# Patient Record
Sex: FEMALE | Race: WHITE | NOT HISPANIC OR LATINO
[De-identification: names, ages, dates, MRNs, and addresses within clinical notes are randomized per-mention and may not be internally consistent; named-entity substitution may affect disease eponyms.]

---

## 2017-04-03 ENCOUNTER — APPOINTMENT (OUTPATIENT)
Dept: OPHTHALMOLOGY | Facility: CLINIC | Age: 62
End: 2017-04-03

## 2017-05-10 ENCOUNTER — APPOINTMENT (OUTPATIENT)
Dept: OPHTHALMOLOGY | Facility: CLINIC | Age: 62
End: 2017-05-10

## 2017-05-10 DIAGNOSIS — H00.16 CHALAZION LEFT EYE, UNSPECIFIED EYELID: ICD-10-CM

## 2017-05-10 DIAGNOSIS — H10.13 ACUTE ATOPIC CONJUNCTIVITIS, BILATERAL: ICD-10-CM

## 2017-06-15 ENCOUNTER — RESULT REVIEW (OUTPATIENT)
Age: 62
End: 2017-06-15

## 2017-08-09 ENCOUNTER — APPOINTMENT (OUTPATIENT)
Dept: OPHTHALMOLOGY | Facility: CLINIC | Age: 62
End: 2017-08-09
Payer: COMMERCIAL

## 2017-08-09 PROCEDURE — 92014 COMPRE OPH EXAM EST PT 1/>: CPT

## 2017-08-16 ENCOUNTER — APPOINTMENT (OUTPATIENT)
Dept: OPHTHALMOLOGY | Facility: CLINIC | Age: 62
End: 2017-08-16
Payer: COMMERCIAL

## 2017-08-16 DIAGNOSIS — B02.30 ZOSTER OCULAR DISEASE, UNSPECIFIED: ICD-10-CM

## 2017-08-16 PROCEDURE — 92012 INTRM OPH EXAM EST PATIENT: CPT

## 2017-09-28 ENCOUNTER — APPOINTMENT (OUTPATIENT)
Dept: PULMONOLOGY | Facility: CLINIC | Age: 62
End: 2017-09-28
Payer: COMMERCIAL

## 2017-09-28 VITALS
DIASTOLIC BLOOD PRESSURE: 80 MMHG | OXYGEN SATURATION: 96 % | HEIGHT: 65 IN | HEART RATE: 82 BPM | WEIGHT: 128 LBS | BODY MASS INDEX: 21.33 KG/M2 | SYSTOLIC BLOOD PRESSURE: 126 MMHG

## 2017-09-28 DIAGNOSIS — Z86.19 PERSONAL HISTORY OF OTHER INFECTIOUS AND PARASITIC DISEASES: ICD-10-CM

## 2017-09-28 DIAGNOSIS — K22.4 DYSKINESIA OF ESOPHAGUS: ICD-10-CM

## 2017-09-28 PROCEDURE — 94010 BREATHING CAPACITY TEST: CPT

## 2017-09-28 PROCEDURE — 94729 DIFFUSING CAPACITY: CPT

## 2017-09-28 PROCEDURE — 99204 OFFICE O/P NEW MOD 45 MIN: CPT | Mod: 25

## 2017-09-28 PROCEDURE — 71020: CPT

## 2017-09-28 PROCEDURE — 94727 GAS DIL/WSHOT DETER LNG VOL: CPT

## 2017-09-28 RX ORDER — MOXIFLOXACIN HYDROCHLORIDE 5 MG/ML
0.5 SOLUTION/ DROPS OPHTHALMIC 4 TIMES DAILY
Qty: 1 | Refills: 2 | Status: DISCONTINUED | COMMUNITY
Start: 2017-08-09 | End: 2017-09-28

## 2017-12-26 ENCOUNTER — APPOINTMENT (OUTPATIENT)
Dept: PULMONOLOGY | Facility: CLINIC | Age: 62
End: 2017-12-26
Payer: COMMERCIAL

## 2017-12-26 VITALS
DIASTOLIC BLOOD PRESSURE: 70 MMHG | HEIGHT: 65 IN | WEIGHT: 128 LBS | SYSTOLIC BLOOD PRESSURE: 110 MMHG | HEART RATE: 93 BPM | BODY MASS INDEX: 21.33 KG/M2 | RESPIRATION RATE: 17 BRPM | OXYGEN SATURATION: 98 %

## 2017-12-26 DIAGNOSIS — J30.9 ALLERGIC RHINITIS, UNSPECIFIED: ICD-10-CM

## 2017-12-26 PROCEDURE — 71020: CPT

## 2017-12-26 PROCEDURE — 99214 OFFICE O/P EST MOD 30 MIN: CPT | Mod: 25

## 2018-01-06 ENCOUNTER — EMERGENCY (EMERGENCY)
Facility: HOSPITAL | Age: 63
LOS: 1 days | Discharge: HOME | End: 2018-01-06
Attending: EMERGENCY MEDICINE
Payer: COMMERCIAL

## 2018-01-06 VITALS
RESPIRATION RATE: 18 BRPM | DIASTOLIC BLOOD PRESSURE: 83 MMHG | OXYGEN SATURATION: 100 % | SYSTOLIC BLOOD PRESSURE: 150 MMHG | TEMPERATURE: 98 F | HEART RATE: 75 BPM

## 2018-01-06 LAB
ALBUMIN SERPL ELPH-MCNC: 4.8 G/DL — SIGNIFICANT CHANGE UP (ref 3.3–5)
ALP SERPL-CCNC: 114 U/L — SIGNIFICANT CHANGE UP (ref 40–120)
ALT FLD-CCNC: 20 U/L RC — SIGNIFICANT CHANGE UP (ref 10–45)
ANION GAP SERPL CALC-SCNC: 14 MMOL/L — SIGNIFICANT CHANGE UP (ref 5–17)
APTT BLD: 27.8 SEC — SIGNIFICANT CHANGE UP (ref 27.5–37.4)
AST SERPL-CCNC: 17 U/L — SIGNIFICANT CHANGE UP (ref 10–40)
BASE EXCESS BLDV CALC-SCNC: 4.5 MMOL/L — HIGH (ref -2–2)
BASOPHILS # BLD AUTO: 0.1 K/UL — SIGNIFICANT CHANGE UP (ref 0–0.2)
BASOPHILS NFR BLD AUTO: 0.7 % — SIGNIFICANT CHANGE UP (ref 0–2)
BILIRUB SERPL-MCNC: 0.3 MG/DL — SIGNIFICANT CHANGE UP (ref 0.2–1.2)
BUN SERPL-MCNC: 23 MG/DL — SIGNIFICANT CHANGE UP (ref 7–23)
CA-I SERPL-SCNC: 1.21 MMOL/L — SIGNIFICANT CHANGE UP (ref 1.12–1.3)
CALCIUM SERPL-MCNC: 9.9 MG/DL — SIGNIFICANT CHANGE UP (ref 8.4–10.5)
CHLORIDE BLDV-SCNC: 104 MMOL/L — SIGNIFICANT CHANGE UP (ref 96–108)
CHLORIDE SERPL-SCNC: 98 MMOL/L — SIGNIFICANT CHANGE UP (ref 96–108)
CK MB CFR SERPL CALC: 1 NG/ML — SIGNIFICANT CHANGE UP (ref 0–3.8)
CK SERPL-CCNC: 26 U/L — SIGNIFICANT CHANGE UP (ref 25–170)
CO2 BLDV-SCNC: 34 MMOL/L — HIGH (ref 22–30)
CO2 SERPL-SCNC: 29 MMOL/L — SIGNIFICANT CHANGE UP (ref 22–31)
CREAT SERPL-MCNC: 0.85 MG/DL — SIGNIFICANT CHANGE UP (ref 0.5–1.3)
EOSINOPHIL # BLD AUTO: 0.1 K/UL — SIGNIFICANT CHANGE UP (ref 0–0.5)
EOSINOPHIL NFR BLD AUTO: 0.4 % — SIGNIFICANT CHANGE UP (ref 0–6)
GAS PNL BLDV: 139 MMOL/L — SIGNIFICANT CHANGE UP (ref 136–145)
GAS PNL BLDV: SIGNIFICANT CHANGE UP
GLUCOSE BLDV-MCNC: 80 MG/DL — SIGNIFICANT CHANGE UP (ref 70–99)
GLUCOSE SERPL-MCNC: 81 MG/DL — SIGNIFICANT CHANGE UP (ref 70–99)
HCO3 BLDV-SCNC: 32 MMOL/L — HIGH (ref 21–29)
HCT VFR BLD CALC: 44.9 % — SIGNIFICANT CHANGE UP (ref 34.5–45)
HCT VFR BLDA CALC: 49 % — SIGNIFICANT CHANGE UP (ref 39–50)
HGB BLD CALC-MCNC: 16.1 G/DL — HIGH (ref 11.5–15.5)
HGB BLD-MCNC: 15.5 G/DL — SIGNIFICANT CHANGE UP (ref 11.5–15.5)
HOROWITZ INDEX BLDV+IHG-RTO: SIGNIFICANT CHANGE UP
INR BLD: 0.96 RATIO — SIGNIFICANT CHANGE UP (ref 0.88–1.16)
LACTATE BLDV-MCNC: 2.2 MMOL/L — HIGH (ref 0.7–2)
LIDOCAIN IGE QN: 22 U/L — SIGNIFICANT CHANGE UP (ref 7–60)
LYMPHOCYTES # BLD AUTO: 32.6 % — SIGNIFICANT CHANGE UP (ref 13–44)
LYMPHOCYTES # BLD AUTO: 5.3 K/UL — HIGH (ref 1–3.3)
MCHC RBC-ENTMCNC: 32 PG — SIGNIFICANT CHANGE UP (ref 27–34)
MCHC RBC-ENTMCNC: 34.5 GM/DL — SIGNIFICANT CHANGE UP (ref 32–36)
MCV RBC AUTO: 92.6 FL — SIGNIFICANT CHANGE UP (ref 80–100)
MONOCYTES # BLD AUTO: 1.3 K/UL — HIGH (ref 0–0.9)
MONOCYTES NFR BLD AUTO: 8.1 % — SIGNIFICANT CHANGE UP (ref 2–14)
NEUTROPHILS # BLD AUTO: 9.5 K/UL — HIGH (ref 1.8–7.4)
NEUTROPHILS NFR BLD AUTO: 58.2 % — SIGNIFICANT CHANGE UP (ref 43–77)
NEUTS HYPERSEG # BLD: PRESENT — SIGNIFICANT CHANGE UP
NT-PROBNP SERPL-SCNC: 167 PG/ML — SIGNIFICANT CHANGE UP (ref 0–300)
PCO2 BLDV: 59 MMHG — HIGH (ref 35–50)
PH BLDV: 7.35 — SIGNIFICANT CHANGE UP (ref 7.35–7.45)
PLAT MORPH BLD: NORMAL — SIGNIFICANT CHANGE UP
PLATELET # BLD AUTO: 321 K/UL — SIGNIFICANT CHANGE UP (ref 150–400)
PO2 BLDV: 24 MMHG — LOW (ref 25–45)
POTASSIUM BLDV-SCNC: 3.6 MMOL/L — SIGNIFICANT CHANGE UP (ref 3.5–5)
POTASSIUM SERPL-MCNC: 3.5 MMOL/L — SIGNIFICANT CHANGE UP (ref 3.5–5.3)
POTASSIUM SERPL-SCNC: 3.5 MMOL/L — SIGNIFICANT CHANGE UP (ref 3.5–5.3)
PROT SERPL-MCNC: 8.2 G/DL — SIGNIFICANT CHANGE UP (ref 6–8.3)
PROTHROM AB SERPL-ACNC: 10.4 SEC — SIGNIFICANT CHANGE UP (ref 9.8–12.7)
RBC # BLD: 4.85 M/UL — SIGNIFICANT CHANGE UP (ref 3.8–5.2)
RBC # FLD: 11.8 % — SIGNIFICANT CHANGE UP (ref 10.3–14.5)
RBC BLD AUTO: NORMAL — SIGNIFICANT CHANGE UP
SAO2 % BLDV: 32 % — LOW (ref 67–88)
SODIUM SERPL-SCNC: 141 MMOL/L — SIGNIFICANT CHANGE UP (ref 135–145)
TROPONIN T SERPL-MCNC: <0.01 NG/ML — SIGNIFICANT CHANGE UP (ref 0–0.06)
TROPONIN T SERPL-MCNC: <0.01 NG/ML — SIGNIFICANT CHANGE UP (ref 0–0.06)
WBC # BLD: 16.3 K/UL — HIGH (ref 3.8–10.5)
WBC # FLD AUTO: 16.3 K/UL — HIGH (ref 3.8–10.5)

## 2018-01-06 PROCEDURE — 71046 X-RAY EXAM CHEST 2 VIEWS: CPT | Mod: 26

## 2018-01-06 PROCEDURE — 93010 ELECTROCARDIOGRAM REPORT: CPT | Mod: 59

## 2018-01-06 PROCEDURE — 99220: CPT | Mod: 25

## 2018-01-06 PROCEDURE — 71045 X-RAY EXAM CHEST 1 VIEW: CPT | Mod: 26

## 2018-01-06 RX ORDER — SODIUM CHLORIDE 9 MG/ML
3 INJECTION INTRAMUSCULAR; INTRAVENOUS; SUBCUTANEOUS ONCE
Qty: 0 | Refills: 0 | Status: COMPLETED | OUTPATIENT
Start: 2018-01-06 | End: 2018-01-06

## 2018-01-06 RX ORDER — SODIUM CHLORIDE 9 MG/ML
3 INJECTION INTRAMUSCULAR; INTRAVENOUS; SUBCUTANEOUS EVERY 8 HOURS
Qty: 0 | Refills: 0 | Status: DISCONTINUED | OUTPATIENT
Start: 2018-01-06 | End: 2018-01-10

## 2018-01-06 RX ORDER — SODIUM CHLORIDE 9 MG/ML
1000 INJECTION INTRAMUSCULAR; INTRAVENOUS; SUBCUTANEOUS ONCE
Qty: 0 | Refills: 0 | Status: COMPLETED | OUTPATIENT
Start: 2018-01-06 | End: 2018-01-06

## 2018-01-06 RX ORDER — PANTOPRAZOLE SODIUM 20 MG/1
40 TABLET, DELAYED RELEASE ORAL
Qty: 0 | Refills: 0 | Status: DISCONTINUED | OUTPATIENT
Start: 2018-01-06 | End: 2018-01-10

## 2018-01-06 RX ORDER — SIMVASTATIN 20 MG/1
10 TABLET, FILM COATED ORAL AT BEDTIME
Qty: 0 | Refills: 0 | Status: DISCONTINUED | OUTPATIENT
Start: 2018-01-06 | End: 2018-01-10

## 2018-01-06 RX ADMIN — SODIUM CHLORIDE 1000 MILLILITER(S): 9 INJECTION INTRAMUSCULAR; INTRAVENOUS; SUBCUTANEOUS at 16:24

## 2018-01-06 RX ADMIN — SODIUM CHLORIDE 3 MILLILITER(S): 9 INJECTION INTRAMUSCULAR; INTRAVENOUS; SUBCUTANEOUS at 15:35

## 2018-01-06 RX ADMIN — SODIUM CHLORIDE 1000 MILLILITER(S): 9 INJECTION INTRAMUSCULAR; INTRAVENOUS; SUBCUTANEOUS at 19:45

## 2018-01-06 RX ADMIN — SODIUM CHLORIDE 3 MILLILITER(S): 9 INJECTION INTRAMUSCULAR; INTRAVENOUS; SUBCUTANEOUS at 22:12

## 2018-01-06 RX ADMIN — Medication 30 MILLIGRAM(S): at 17:45

## 2018-01-06 RX ADMIN — SIMVASTATIN 10 MILLIGRAM(S): 20 TABLET, FILM COATED ORAL at 22:15

## 2018-01-06 NOTE — ED CDU PROVIDER DISPOSITION NOTE - CLINICAL COURSE
61yo female p/w chest pain. Pt. has had bronchitis since December 19th. was on zithromax for 7 days. Yesterday finished coarse of doxycycline. on prednisone 30mg and will taper this week. Yesterday, pt. had some abdominal discomfort, used 2 enemas had bowel movements, felt better. At 11pm, pt. had pain in chin and epigastric discomfort, took tums and it improved after passing flatus. Pt. seen at urgent care, had EKG done and reported that it was normal, referred to ED. Currently pt. reports that pain has resolved. Xray on 12/26 was clear per patient. PMD: Dr. Jasper Chapman. Denies fevers, vomiting, diarrhea, dysuria/hematuria, recent travel. Pt. reports cough, no sputum. +sick contacts with URI sx's. Pt. was scheduled for stress test at the beginning of the month. Pola negative x 2. Stress test __________. 61yo female p/w chest pain. Pt. has had bronchitis since December 19th. was on zithromax for 7 days. Yesterday finished coarse of doxycycline. on prednisone 30mg and will taper this week. Yesterday, pt. had some abdominal discomfort, used 2 enemas had bowel movements, felt better. At 11pm, pt. had pain in chin and epigastric discomfort, took tums and it improved after passing flatus. Pt. seen at urgent care, had EKG done and reported that it was normal, referred to ED. Currently pt. reports that pain has resolved. Xray on 12/26 was clear per patient. PMD: Dr. Jasper Chapman. Denies fevers, vomiting, diarrhea, dysuria/hematuria, recent travel. Pt. reports cough, no sputum. +sick contacts with URI sx's. Pt. was scheduled for stress test at the beginning of the month. Pola negative x 2. Pt resting comfortably. NAD. No complaints. VSS. Neg stress for acute pathology, results discussed with Dr. Chapman Pt to follow up with with him in office on Thursday. tolerating PO. stable for discharge; spoke to Dr. Chapa.

## 2018-01-06 NOTE — ED CDU PROVIDER INITIAL DAY NOTE - PROGRESS NOTE DETAILS
CDU NOTE DORA MAXWELL: Pt resting comfortably, feeling well without complaint. states she had some indigestion earlier. no CP or heartburn now. doesn't want any medication for indigestion. NAD, VSS. No events on telemetry. CV: S1S2 RRR, Lungs: CTA B/L.

## 2018-01-06 NOTE — ED PROVIDER NOTE - MEDICAL DECISION MAKING DETAILS
JPATEL: 63 y/o female with Crohn's disease, currently on steroids for asthma, finished two courses of antibiotics, had episode of jaw pain, with substernal gas pain, 2 episodes including one that woke her from her sleep, pt with no hx of CAD, last stress test years ago, cardiologist Jasper Chapman, nonsmoker, seen at urgent care and sent here for CHLOE, 1)tele 2)EKG 3)CBC, CMP, CE, CXR, 4)Cardiology 5)admit

## 2018-01-06 NOTE — ED ADULT NURSE NOTE - CHPI ED SYMPTOMS NEG
no cough/no dizziness/no fever/no chills/no diaphoresis/no back pain/no nausea/no syncope/no shortness of breath/no vomiting

## 2018-01-06 NOTE — ED CDU PROVIDER DISPOSITION NOTE - ATTENDING CONTRIBUTION TO CARE
I have personally performed a face to face diagnostic evaluation on this patient.  I have reviewed the ACP note and agree with the history, exam, and plan of care, except as noted.  History and Exam by me shows  See CDU initial day note,Stable for dc  Alex Chapa MD, Facep

## 2018-01-06 NOTE — ED CDU PROVIDER INITIAL DAY NOTE - MEDICAL DECISION MAKING DETAILS
JPATEL: 61 y/o female with Crohn's disease, currently on steroids for asthma, finished two courses of antibiotics, had episode of jaw pain, with substernal gas pain, 2 episodes including one that woke her from her sleep, pt with no hx of CAD, last stress test years ago, cardiologist Jasper Chapman, nonsmoker, seen at urgent care and sent here for CHLOE, EKG NSR 74 bpm, nonspecific ST-T changes, CE x 1 negative, CXR NAD, will admit to CDU for serial cardiac enzymes, tele monitoring, observation and stress test.

## 2018-01-06 NOTE — ED PROVIDER NOTE - OBJECTIVE STATEMENT
63yo female p/w chest pain. December 19th pt. has had bronchitis since then, on zithromax for 7 days. Yesterday finished coarse of doxycycline, prednisone, now up to 30mg and will taper this week. Yesterday, pt. had some abdominal discomfort, used 2 enemas had bowel movements, felt better. At  11pm, pt. had pain in chin and epigastric discomfort, took tums and it improved after passing flatus. Pt. seen at urgent care, had EKG done and reported that it was normal, referred to ED. Currently pt. reports that pain has resolved. Xray on 12/26 was clear per patient.PMD: Dr. Jasper Chapman. Denies fevers, vomiting, diarrhea, dysuria/hematuria, recent travel. Pt. reports cough, no sputum. +sick contacts with URI sx's. Pt. was scheduled for stress test at the beginning of the month.

## 2018-01-06 NOTE — ED ADULT NURSE NOTE - OBJECTIVE STATEMENT
Female 62 years old with history of Chron's Disease came in for chest pain. Pt reports it's like "gas pain" which started on her jaw and radiates to her chest last night. [facility] and was referred here for further evaluation. Pt denies chest pain in ED. No sob, nausea and vomiting, fever and chills. EKG completed. All labs obtained. Will monitor.  at bedside,.

## 2018-01-06 NOTE — ED PROVIDER NOTE - ATTENDING CONTRIBUTION TO CARE
Attending MD WOLFE:  I personally have seen and examined this patient.  Resident note reviewed and agree on plan of care and except where noted.  See MDM for details.

## 2018-01-06 NOTE — ED CDU PROVIDER INITIAL DAY NOTE - ATTENDING CONTRIBUTION TO CARE
Attending MD Blancas;:   I personally have seen and examined this patient.  Physician assistant note reviewed and agree on plan of care and except where noted.  See MDM for details.

## 2018-01-06 NOTE — ED ADULT NURSE REASSESSMENT NOTE - COMFORT CARE
repositioned/plan of care explained/treatment delay explained/wait time explained/warm blanket provided

## 2018-01-06 NOTE — ED CDU PROVIDER INITIAL DAY NOTE - DETAILS
63 y/o F p/w chest pain radiating to jaw since last night, intermittent  - continuous monitoring and frequent reevaluations   - repeat cardiac enzymes and EKGs  - exercise nuclear stress test in AM   - Adela to see in AM   - d/w Dr. BABAK Blancas

## 2018-01-07 VITALS
DIASTOLIC BLOOD PRESSURE: 83 MMHG | RESPIRATION RATE: 17 BRPM | SYSTOLIC BLOOD PRESSURE: 130 MMHG | TEMPERATURE: 98 F | HEART RATE: 70 BPM | OXYGEN SATURATION: 98 %

## 2018-01-07 LAB — HBA1C BLD-MCNC: 5.5 % — SIGNIFICANT CHANGE UP (ref 4–5.6)

## 2018-01-07 PROCEDURE — 84484 ASSAY OF TROPONIN QUANT: CPT

## 2018-01-07 PROCEDURE — 93018 CV STRESS TEST I&R ONLY: CPT

## 2018-01-07 PROCEDURE — 80053 COMPREHEN METABOLIC PANEL: CPT

## 2018-01-07 PROCEDURE — 71045 X-RAY EXAM CHEST 1 VIEW: CPT

## 2018-01-07 PROCEDURE — 82435 ASSAY OF BLOOD CHLORIDE: CPT

## 2018-01-07 PROCEDURE — 83605 ASSAY OF LACTIC ACID: CPT

## 2018-01-07 PROCEDURE — 84295 ASSAY OF SERUM SODIUM: CPT

## 2018-01-07 PROCEDURE — 85014 HEMATOCRIT: CPT

## 2018-01-07 PROCEDURE — 83880 ASSAY OF NATRIURETIC PEPTIDE: CPT

## 2018-01-07 PROCEDURE — 83036 HEMOGLOBIN GLYCOSYLATED A1C: CPT

## 2018-01-07 PROCEDURE — 93017 CV STRESS TEST TRACING ONLY: CPT

## 2018-01-07 PROCEDURE — 82550 ASSAY OF CK (CPK): CPT

## 2018-01-07 PROCEDURE — 99283 EMERGENCY DEPT VISIT LOW MDM: CPT | Mod: 25

## 2018-01-07 PROCEDURE — 85730 THROMBOPLASTIN TIME PARTIAL: CPT

## 2018-01-07 PROCEDURE — 85610 PROTHROMBIN TIME: CPT

## 2018-01-07 PROCEDURE — 78452 HT MUSCLE IMAGE SPECT MULT: CPT | Mod: 26

## 2018-01-07 PROCEDURE — G0378: CPT

## 2018-01-07 PROCEDURE — 82553 CREATINE MB FRACTION: CPT

## 2018-01-07 PROCEDURE — 82330 ASSAY OF CALCIUM: CPT

## 2018-01-07 PROCEDURE — 82947 ASSAY GLUCOSE BLOOD QUANT: CPT

## 2018-01-07 PROCEDURE — A9500: CPT

## 2018-01-07 PROCEDURE — 84132 ASSAY OF SERUM POTASSIUM: CPT

## 2018-01-07 PROCEDURE — 99217: CPT

## 2018-01-07 PROCEDURE — 85027 COMPLETE CBC AUTOMATED: CPT

## 2018-01-07 PROCEDURE — 78452 HT MUSCLE IMAGE SPECT MULT: CPT

## 2018-01-07 PROCEDURE — 71046 X-RAY EXAM CHEST 2 VIEWS: CPT

## 2018-01-07 PROCEDURE — 82803 BLOOD GASES ANY COMBINATION: CPT

## 2018-01-07 PROCEDURE — 83690 ASSAY OF LIPASE: CPT

## 2018-01-07 PROCEDURE — 93005 ELECTROCARDIOGRAM TRACING: CPT | Mod: 76,XU

## 2018-01-07 PROCEDURE — 93016 CV STRESS TEST SUPVJ ONLY: CPT

## 2018-01-07 PROCEDURE — 80061 LIPID PANEL: CPT

## 2018-01-07 RX ADMIN — SODIUM CHLORIDE 3 MILLILITER(S): 9 INJECTION INTRAMUSCULAR; INTRAVENOUS; SUBCUTANEOUS at 06:33

## 2018-01-07 RX ADMIN — PANTOPRAZOLE SODIUM 40 MILLIGRAM(S): 20 TABLET, DELAYED RELEASE ORAL at 11:48

## 2018-01-07 NOTE — ED CDU PROVIDER SUBSEQUENT DAY NOTE - PROGRESS NOTE DETAILS
CDU progress note DORA Bird: Patient sleeping comfortably. NAD. VSS. No events on telemetry. Patient resting in bed comfortably. No distress, no complaints. Vital Signs Stable. No events on telemetry monitor; pending stress test. Pt seen and evaluated by Dr. Chapman left cell at [phone] to discuss results of stress. Pt resting comfortable. No complaints. VSS. Will continue to observe and reassess Pt resting comfortably. NAD. No complaints. VSS. Spoke to Dr. Chapman and Dr. gil with results of the stress test. Pt to follow up in office with Dr. Chapman on Thursday. Understands and no active complaints

## 2018-01-07 NOTE — ED CDU PROVIDER SUBSEQUENT DAY NOTE - HISTORY
No interval changes since initial CDU provider note. Pt feels well without complaint. No CP. NAD VSS. no events on tele. Pola negative x 2. pt to get stress test in AM. will continue monitoring. - DORA Bird

## 2018-01-07 NOTE — ED CDU PROVIDER SUBSEQUENT DAY NOTE - ATTENDING CONTRIBUTION TO CARE
Private Physician Adela  63yo female p/w chest pain. PMH Crohns dz, HLD, no habits,dm,htn,travel Pt. has had bronchitis since December 19th. was on zithromax for 7 days. Yesterday finished coarse of doxycycline. on prednisone 30mg. Now pw complains episode of abd pain self limited. Later in evening rad to chest like "gas pains" rad to jaw. Symptoms off/on since. PE WDWN female awake alert normocephalic atraumatic chest clear CV no rubs, gallops or murmurs neruo no focal defectgs neuro no focal defects  Alex Chapa MD, Facep

## 2018-01-07 NOTE — ED ADULT NURSE REASSESSMENT NOTE - NS ED NURSE REASSESS COMMENT FT1
report taken from  Lupe REED
Pt report received from Chasidy REED. Pt transferred to cdu for epigastric pain/ chest pain. Pt a/ox3 denies respiratory distress, sob, dyspnea, C/P, palpitations, n/v/d at this time. Pt states she is having "a lot of gas" after eating, causing pain in epigastric region. Pt currently awaiting labs/ekg/stress at this time. Tele monitor in place HR 60's NSR. VSS, afebrile, IV clean/dry/intact. Pt aware of plan of care, call bell within reach ,safety maintained. Will monitor and assess.

## 2018-01-07 NOTE — CONSULT NOTE ADULT - SUBJECTIVE AND OBJECTIVE BOX
Patient is a 62y old  Female who presents with a chief complaint of CP,jaw pain,epigastric distress.Patient with h/o MVP,HTN,hyperlipidemia, and IBD.No sob.Patient with recent bronchitis on steroids.        PAST MEDICAL & SURGICAL HISTORY:  Basal Cell Carcinoma, Face  Crohn's Disease  S/P Colon Resection    Allergies    penicillin (Unknown)  sulfa drugs (Unknown)    Intolerances      MEDICATIONS  (STANDING):  pantoprazole    Tablet 40 milliGRAM(s) Oral before breakfast  simvastatin 10 milliGRAM(s) Oral at bedtime  sodium chloride 0.9% lock flush 3 milliLiter(s) IV Push every 8 hours    MEDICATIONS  (PRN):    FAMILY HISTORY:      ROS:  Positive:    General: Denies weight loss, fevers, rash, decreased hearing  Cardiac: Denies chest pain, SOB, MTZ, orthopnea, PND, claudication, edema, snoring, daytime somnolence, palpitations, syncope  Resp: Denies SOB, MTZ, cough, sputum, wheezing, hemoptysis  GI: Denies change in bowel habits, diarrhea, weight loss, melena, tarry stools,   nausea, vomiting, jaundice, abdominal pain, dysphagia  : Denies dysuria, nocturia, hematuria  Neuro: Denies tinnitus, headache, visual changes, weakness, dizziness or vertigo  Musculoskeletal: Denies neck pain back pain joint pain.  Skin: Denies rash, itching, dryness.  Endocrine: Denies polydipsia, polyuria  Psychiatric: Denies depression, anxiety  All other review of systems is negative unless indicated above.    PHYSICAL EXAM:  Vital Signs Last 24 Hrs  T(C): 36.7 (07 Jan 2018 03:54), Max: 37.1 (06 Jan 2018 19:46)  T(F): 98 (07 Jan 2018 03:54), Max: 98.8 (06 Jan 2018 19:46)  HR: 63 (07 Jan 2018 03:54) (63 - 75)  BP: 114/72 (07 Jan 2018 03:54) (114/72 - 156/95)  BP(mean): --  RR: 17 (07 Jan 2018 03:54) (16 - 18)  SpO2: 98% (07 Jan 2018 03:54) (97% - 100%)    I&O's Summary      General Appearance: 	 Alert, cooperative, no distress  HEENT: normocephalic, atraumatic, PERRLA, EOMI, conjunctiva normal, sclera anicteric,   Neck: no JVD,  carotid 2+  bilaterally without bruits, thyroid normal to inspection and palpation, no adenopathy, trachea midline  Lungs:  clear to auscultation and percussion bilaterally  Cor:  pmi 5th ICS MCL, regular rate and rhythm, S1 normal intensity, S2 normal intensity, no gallops, mumrus or rubs  Abdomen:	 soft, non-tender; bowel sounds normal; no masses,  no organomegaly  Extremities: without cyanosis, clubbing or edema  Vasc: 2-+ PT and DP pulses; no varicosities  Neurologic: A&O x 3 (time, place, person). Symmetric strength; limited exam  Musculoskeletal: no kyphosis, scoliosis; normal gait, normal tone  Skin: no rashes; limited exam    LABS:                        15.5   16.3  )-----------( 321      ( 06 Jan 2018 15:53 )             44.9     01-06    141  |  98  |  23  ----------------------------<  81  3.5   |  29  |  0.85    Ca    9.9      06 Jan 2018 15:53    TPro  8.2  /  Alb  4.8  /  TBili  0.3  /  DBili  x   /  AST  17  /  ALT  20  /  AlkPhos  114  01-06    Hemoglobin A1C, Whole Blood: 5.5 % (01-07 @ 05:59)      CAPILLARY BLOOD GLUCOSE          PT/INR - ( 06 Jan 2018 15:53 )   PT: 10.4 sec;   INR: 0.96 ratio         PTT - ( 06 Jan 2018 15:53 )  PTT:27.8 sec    EKG NSR,no acute changes.CXR no acute disease.      Impression/Plan:Patient with MVP,HTN,hyperlipidemia,IBD with atyipical CP,jaw pain and epigastric distress.All nonexertional,no SOB or pleuritic CP.EKG and CXR normal.Cardiac enzymes negative.Most c/w GI etiology,continue PPI.Stress test today to evaluate for ischemic disease.      Jasper Chapman MD [facility] Patient is a 62y old  Female who presents with a chief complaint of CP,jaw pain,epigastric distress.Patient with h/o MVP,HTN,hyperlipidemia, and IBD.No sob.Patient with recent bronchitis on steroids.        PAST MEDICAL & SURGICAL HISTORY:  Basal Cell Carcinoma, Face  Crohn's Disease  S/P Colon Resection    Allergies    penicillin (Unknown)  sulfa drugs (Unknown)    Intolerances      MEDICATIONS  (STANDING):  pantoprazole    Tablet 40 milliGRAM(s) Oral before breakfast  simvastatin 10 milliGRAM(s) Oral at bedtime  sodium chloride 0.9% lock flush 3 milliLiter(s) IV Push every 8 hours    MEDICATIONS  (PRN):    FAMILY HISTORY:      ROS:  Positive:CP    General: Denies weight loss, fevers, rash, decreased hearing  Cardiac: Denies SOB, MTZ, orthopnea, PND, claudication, edema, snoring, daytime somnolence, palpitations, syncope  Resp: Denies SOB, MTZ, cough, sputum, wheezing, hemoptysis  GI: Denies change in bowel habits, diarrhea, weight loss, melena, tarry stools,   nausea, vomiting, jaundice, abdominal pain, dysphagia  : Denies dysuria, nocturia, hematuria  Neuro: Denies tinnitus, headache, visual changes, weakness, dizziness or vertigo  Musculoskeletal: Denies neck pain back pain joint pain.  Skin: Denies rash, itching, dryness.  Endocrine: Denies polydipsia, polyuria  Psychiatric: Denies depression, anxiety  All other review of systems is negative unless indicated above.    PHYSICAL EXAM:  Vital Signs Last 24 Hrs  T(C): 36.7 (07 Jan 2018 03:54), Max: 37.1 (06 Jan 2018 19:46)  T(F): 98 (07 Jan 2018 03:54), Max: 98.8 (06 Jan 2018 19:46)  HR: 63 (07 Jan 2018 03:54) (63 - 75)  BP: 114/72 (07 Jan 2018 03:54) (114/72 - 156/95)  BP(mean): --  RR: 17 (07 Jan 2018 03:54) (16 - 18)  SpO2: 98% (07 Jan 2018 03:54) (97% - 100%)    I&O's Summary      General Appearance: 	 Alert, cooperative, no distress  HEENT: normocephalic, atraumatic, PERRLA, EOMI, conjunctiva normal, sclera anicteric,   Neck: no JVD,  carotid 2+  bilaterally without bruits, thyroid normal to inspection and palpation, no adenopathy, trachea midline  Lungs:  clear to auscultation and percussion bilaterally  Cor:  pmi 5th ICS MCL, regular rate and rhythm, S1 normal intensity, S2 normal intensity, no gallops, mumrus or rubs  Abdomen:	 soft, non-tender; bowel sounds normal; no masses,  no organomegaly  Extremities: without cyanosis, clubbing or edema  Vasc: 2-+ PT and DP pulses; no varicosities  Neurologic: A&O x 3 (time, place, person). Symmetric strength; limited exam  Musculoskeletal: no kyphosis, scoliosis; normal gait, normal tone  Skin: no rashes; limited exam    LABS:                        15.5   16.3  )-----------( 321      ( 06 Jan 2018 15:53 )             44.9     01-06    141  |  98  |  23  ----------------------------<  81  3.5   |  29  |  0.85    Ca    9.9      06 Jan 2018 15:53    TPro  8.2  /  Alb  4.8  /  TBili  0.3  /  DBili  x   /  AST  17  /  ALT  20  /  AlkPhos  114  01-06    Hemoglobin A1C, Whole Blood: 5.5 % (01-07 @ 05:59)      CAPILLARY BLOOD GLUCOSE          PT/INR - ( 06 Jan 2018 15:53 )   PT: 10.4 sec;   INR: 0.96 ratio         PTT - ( 06 Jan 2018 15:53 )  PTT:27.8 sec    EKG NSR,no acute changes.CXR no acute disease.      Impression/Plan:Patient with MVP,HTN,hyperlipidemia,IBD with atyipical CP,jaw pain and epigastric distress.All nonexertional,no SOB or pleuritic CP.EKG and CXR normal.Cardiac enzymes negative.Most c/w GI etiology,continue PPI.Stress test today to evaluate for ischemic disease.Increased WBC most c/w steroids,afebrile,non-toxic.      Jasper Chapman MD [facility]

## 2018-01-07 NOTE — ED CDU PROVIDER SUBSEQUENT DAY NOTE - MEDICAL DECISION MAKING DETAILS
cp ro acs check serial trop/ekg/stress. Follow up results with  cards attending  Alex Chapa MD, Facep

## 2018-02-14 ENCOUNTER — APPOINTMENT (OUTPATIENT)
Dept: PULMONOLOGY | Facility: CLINIC | Age: 63
End: 2018-02-14
Payer: COMMERCIAL

## 2018-02-14 VITALS
BODY MASS INDEX: 21.33 KG/M2 | RESPIRATION RATE: 17 BRPM | OXYGEN SATURATION: 99 % | HEART RATE: 84 BPM | SYSTOLIC BLOOD PRESSURE: 100 MMHG | WEIGHT: 128 LBS | HEIGHT: 65 IN | DIASTOLIC BLOOD PRESSURE: 70 MMHG

## 2018-02-14 DIAGNOSIS — R07.89 OTHER CHEST PAIN: ICD-10-CM

## 2018-02-14 DIAGNOSIS — J45.901 ACUTE BRONCHITIS, UNSPECIFIED: ICD-10-CM

## 2018-02-14 DIAGNOSIS — J20.9 ACUTE BRONCHITIS, UNSPECIFIED: ICD-10-CM

## 2018-02-14 PROCEDURE — 99214 OFFICE O/P EST MOD 30 MIN: CPT | Mod: 25

## 2018-02-14 PROCEDURE — 94010 BREATHING CAPACITY TEST: CPT

## 2018-02-14 RX ORDER — AZITHROMYCIN 500 MG/1
500 TABLET, FILM COATED ORAL DAILY
Qty: 5 | Refills: 1 | Status: DISCONTINUED | COMMUNITY
Start: 2017-09-28 | End: 2018-02-14

## 2018-02-14 RX ORDER — PREDNISONE 10 MG/1
10 TABLET ORAL
Qty: 1 | Refills: 0 | Status: DISCONTINUED | COMMUNITY
Start: 2017-12-26 | End: 2018-02-14

## 2018-02-14 RX ORDER — AZITHROMYCIN 250 MG/1
250 TABLET, FILM COATED ORAL
Qty: 6 | Refills: 0 | Status: DISCONTINUED | COMMUNITY
Start: 2017-11-10 | End: 2018-02-14

## 2018-02-14 RX ORDER — DOXYCYCLINE HYCLATE 100 MG/1
100 CAPSULE ORAL
Qty: 20 | Refills: 0 | Status: DISCONTINUED | COMMUNITY
Start: 2017-12-26 | End: 2018-02-14

## 2018-02-14 RX ORDER — DEXLANSOPRAZOLE 60 MG/1
60 CAPSULE, DELAYED RELEASE ORAL
Refills: 0 | Status: ACTIVE | COMMUNITY

## 2018-02-14 RX ORDER — BENZONATATE 200 MG/1
200 CAPSULE ORAL
Qty: 90 | Refills: 1 | Status: DISCONTINUED | COMMUNITY
Start: 2017-12-28 | End: 2018-02-14

## 2018-02-20 ENCOUNTER — APPOINTMENT (OUTPATIENT)
Dept: ENDOCRINOLOGY | Facility: CLINIC | Age: 63
End: 2018-02-20
Payer: COMMERCIAL

## 2018-02-20 VITALS
HEIGHT: 65 IN | WEIGHT: 136 LBS | BODY MASS INDEX: 22.66 KG/M2 | HEART RATE: 77 BPM | SYSTOLIC BLOOD PRESSURE: 118 MMHG | OXYGEN SATURATION: 98 % | DIASTOLIC BLOOD PRESSURE: 70 MMHG

## 2018-02-20 DIAGNOSIS — Z84.89 FAMILY HISTORY OF OTHER SPECIFIED CONDITIONS: ICD-10-CM

## 2018-02-20 DIAGNOSIS — E04.2 NONTOXIC MULTINODULAR GOITER: ICD-10-CM

## 2018-02-20 DIAGNOSIS — Z87.39 PERSONAL HISTORY OF OTHER DISEASES OF THE MUSCULOSKELETAL SYSTEM AND CONNECTIVE TISSUE: ICD-10-CM

## 2018-02-20 DIAGNOSIS — M81.0 AGE-RELATED OSTEOPOROSIS W/OUT CURRENT PATHOLOGICAL FRACTURE: ICD-10-CM

## 2018-02-20 PROCEDURE — 99244 OFF/OP CNSLTJ NEW/EST MOD 40: CPT

## 2018-02-28 ENCOUNTER — APPOINTMENT (OUTPATIENT)
Dept: PULMONOLOGY | Facility: CLINIC | Age: 63
End: 2018-02-28

## 2018-05-11 ENCOUNTER — APPOINTMENT (OUTPATIENT)
Dept: OPHTHALMOLOGY | Facility: CLINIC | Age: 63
End: 2018-05-11
Payer: COMMERCIAL

## 2018-05-11 DIAGNOSIS — H04.129 DRY EYE SYNDROME OF UNSPECIFIED LACRIMAL GLAND: ICD-10-CM

## 2018-05-11 PROCEDURE — 92014 COMPRE OPH EXAM EST PT 1/>: CPT

## 2018-05-29 ENCOUNTER — APPOINTMENT (OUTPATIENT)
Dept: PULMONOLOGY | Facility: CLINIC | Age: 63
End: 2018-05-29
Payer: COMMERCIAL

## 2018-05-29 VITALS
SYSTOLIC BLOOD PRESSURE: 120 MMHG | OXYGEN SATURATION: 98 % | WEIGHT: 136 LBS | DIASTOLIC BLOOD PRESSURE: 60 MMHG | HEART RATE: 75 BPM | BODY MASS INDEX: 22.66 KG/M2 | HEIGHT: 65 IN

## 2018-05-29 DIAGNOSIS — J06.9 ACUTE UPPER RESPIRATORY INFECTION, UNSPECIFIED: ICD-10-CM

## 2018-05-29 PROCEDURE — 99214 OFFICE O/P EST MOD 30 MIN: CPT

## 2018-08-08 ENCOUNTER — RX RENEWAL (OUTPATIENT)
Age: 63
End: 2018-08-08

## 2019-03-04 ENCOUNTER — APPOINTMENT (OUTPATIENT)
Dept: ENDOCRINOLOGY | Facility: CLINIC | Age: 64
End: 2019-03-04

## 2019-09-23 ENCOUNTER — OUTPATIENT (OUTPATIENT)
Dept: OUTPATIENT SERVICES | Facility: HOSPITAL | Age: 64
LOS: 1 days | End: 2019-09-23
Payer: COMMERCIAL

## 2019-09-23 ENCOUNTER — APPOINTMENT (OUTPATIENT)
Dept: CT IMAGING | Facility: CLINIC | Age: 64
End: 2019-09-23
Payer: COMMERCIAL

## 2019-09-23 DIAGNOSIS — Z00.8 ENCOUNTER FOR OTHER GENERAL EXAMINATION: ICD-10-CM

## 2019-09-23 PROCEDURE — 74177 CT ABD & PELVIS W/CONTRAST: CPT | Mod: 26

## 2019-09-23 PROCEDURE — 74177 CT ABD & PELVIS W/CONTRAST: CPT

## 2020-05-08 ENCOUNTER — APPOINTMENT (OUTPATIENT)
Dept: OPHTHALMOLOGY | Facility: CLINIC | Age: 65
End: 2020-05-08
Payer: MEDICARE

## 2020-05-08 ENCOUNTER — NON-APPOINTMENT (OUTPATIENT)
Age: 65
End: 2020-05-08

## 2020-05-08 PROCEDURE — 92014 COMPRE OPH EXAM EST PT 1/>: CPT

## 2020-06-10 ENCOUNTER — APPOINTMENT (OUTPATIENT)
Dept: OPHTHALMOLOGY | Facility: CLINIC | Age: 65
End: 2020-06-10
Payer: MEDICARE

## 2020-06-10 ENCOUNTER — NON-APPOINTMENT (OUTPATIENT)
Age: 65
End: 2020-06-10

## 2020-06-10 PROCEDURE — 92014 COMPRE OPH EXAM EST PT 1/>: CPT

## 2020-09-26 ENCOUNTER — TRANSCRIPTION ENCOUNTER (OUTPATIENT)
Age: 65
End: 2020-09-26

## 2020-10-29 ENCOUNTER — TRANSCRIPTION ENCOUNTER (OUTPATIENT)
Age: 65
End: 2020-10-29

## 2020-12-16 PROBLEM — J06.9 ACUTE URI: Status: RESOLVED | Noted: 2018-05-29 | Resolved: 2020-12-16

## 2021-03-09 ENCOUNTER — NON-APPOINTMENT (OUTPATIENT)
Age: 66
End: 2021-03-09

## 2021-03-10 ENCOUNTER — APPOINTMENT (OUTPATIENT)
Dept: OPHTHALMOLOGY | Facility: CLINIC | Age: 66
End: 2021-03-10
Payer: MEDICARE

## 2021-03-10 ENCOUNTER — NON-APPOINTMENT (OUTPATIENT)
Age: 66
End: 2021-03-10

## 2021-03-10 PROCEDURE — 92014 COMPRE OPH EXAM EST PT 1/>: CPT

## 2021-03-10 PROCEDURE — 92250 FUNDUS PHOTOGRAPHY W/I&R: CPT

## 2021-04-20 ENCOUNTER — APPOINTMENT (OUTPATIENT)
Dept: OPHTHALMOLOGY | Facility: CLINIC | Age: 66
End: 2021-04-20
Payer: MEDICARE

## 2021-04-20 ENCOUNTER — NON-APPOINTMENT (OUTPATIENT)
Age: 66
End: 2021-04-20

## 2021-04-20 PROCEDURE — 92012 INTRM OPH EXAM EST PATIENT: CPT

## 2021-09-22 ENCOUNTER — TRANSCRIPTION ENCOUNTER (OUTPATIENT)
Age: 66
End: 2021-09-22

## 2022-02-15 ENCOUNTER — APPOINTMENT (OUTPATIENT)
Dept: ULTRASOUND IMAGING | Facility: CLINIC | Age: 67
End: 2022-02-15

## 2022-02-15 ENCOUNTER — APPOINTMENT (OUTPATIENT)
Dept: MAMMOGRAPHY | Facility: CLINIC | Age: 67
End: 2022-02-15

## 2022-02-28 ENCOUNTER — APPOINTMENT (OUTPATIENT)
Dept: MAMMOGRAPHY | Facility: CLINIC | Age: 67
End: 2022-02-28
Payer: MEDICARE

## 2022-02-28 ENCOUNTER — APPOINTMENT (OUTPATIENT)
Dept: ULTRASOUND IMAGING | Facility: CLINIC | Age: 67
End: 2022-02-28
Payer: MEDICARE

## 2022-02-28 PROCEDURE — 77063 BREAST TOMOSYNTHESIS BI: CPT

## 2022-02-28 PROCEDURE — 77067 SCR MAMMO BI INCL CAD: CPT

## 2022-02-28 PROCEDURE — 76641 ULTRASOUND BREAST COMPLETE: CPT | Mod: 50

## 2022-04-21 ENCOUNTER — APPOINTMENT (OUTPATIENT)
Dept: CT IMAGING | Facility: CLINIC | Age: 67
End: 2022-04-21
Payer: MEDICARE

## 2022-04-21 ENCOUNTER — OUTPATIENT (OUTPATIENT)
Dept: OUTPATIENT SERVICES | Facility: HOSPITAL | Age: 67
LOS: 1 days | End: 2022-04-21
Payer: MEDICARE

## 2022-04-21 DIAGNOSIS — Z00.8 ENCOUNTER FOR OTHER GENERAL EXAMINATION: ICD-10-CM

## 2022-04-21 PROCEDURE — 74177 CT ABD & PELVIS W/CONTRAST: CPT | Mod: 26,MH

## 2022-04-21 PROCEDURE — 74177 CT ABD & PELVIS W/CONTRAST: CPT | Mod: MH

## 2022-05-04 ENCOUNTER — NON-APPOINTMENT (OUTPATIENT)
Age: 67
End: 2022-05-04

## 2022-05-04 ENCOUNTER — APPOINTMENT (OUTPATIENT)
Dept: OPHTHALMOLOGY | Facility: CLINIC | Age: 67
End: 2022-05-04
Payer: MEDICARE

## 2022-05-04 PROCEDURE — 92250 FUNDUS PHOTOGRAPHY W/I&R: CPT

## 2022-05-04 PROCEDURE — 92014 COMPRE OPH EXAM EST PT 1/>: CPT

## 2022-05-10 ENCOUNTER — NON-APPOINTMENT (OUTPATIENT)
Age: 67
End: 2022-05-10

## 2022-05-18 ENCOUNTER — NON-APPOINTMENT (OUTPATIENT)
Age: 67
End: 2022-05-18

## 2022-05-24 ENCOUNTER — RESULT REVIEW (OUTPATIENT)
Age: 67
End: 2022-05-24

## 2022-06-03 NOTE — ED ADULT NURSE NOTE - RELIEVING FACTORS
"Anesthesia Pre Eval Note  Visit Vitals  BP (!) 152/72 (BP Location: RUE - Right upper extremity, Patient Position: Semi-John's)   Pulse (!) 53   Temp 36.7 Â°C (98 Â°F) (Temporal)   Resp 16   Ht 5' 4"" (1.626 m)   Wt 98.3 kg (216 lb 11.4 oz)   SpO2 99%   BMI 37.20 kg/mÂ²     Anesthesia ROS/Med Hx    Overall Review:  EKG was reviewed     Anesthetic Complication History:  Patient does not have a history of anesthetic complications      Pulmonary Review:  Patient does not have a pulmonary history      Neuro/Psych Review:  Patient does not have a neuro/psych history       Cardiovascular Review:  Exercise tolerance: good (>4 METS)  Positive for hypertension    GI/HEPATIC/RENAL Review:    Positive for GERD    End/Other Review:    Positive for hypothyroidism  Additional Results:  EKG:  No results found for this or any previous visit (from the past 4464 hour(s)).     ALLERGIES:   -- Aspirin -- SWELLING   -- Tylenol With Codeine -- Other (See Comments)       Last Labs        Component                Value               Date/Time                  WBC                      6.3                 12/08/2021 1051            RBC                      5.13                12/08/2021 1051            HGB                      14.6                12/08/2021 1051            HCT                      46.0                12/08/2021 1051            MCV                      89.7                12/08/2021 1051            MCH                      28.5                12/08/2021 1051            MCHC                     31.7 (L)            12/08/2021 1051            RDW-CV                   13.2                12/08/2021 1051            Sodium                   137                 12/08/2021 1051            Potassium                4.2                 12/08/2021 1051            Chloride                 106                 12/08/2021 1051            Carbon Dioxide           21                  12/08/2021 1051            Glucose                  87          " 2021 1051            BUN                      12                  2021 1051            Creatinine               0.79                2021 1051            Glomerular Filtrati*     76                  2021 1051            Calcium                  9.2                 2021 1051            PLT                      187                 2021 1051        Past Medical History:  No date: Bradshaw's esophagus  No date: Essential (primary) hypertension  No date: Gastroesophageal reflux disease  No date: Shingles  2014: Stasis dermatitis  No date: Thyroid disease  No date: Type 1 macular telangiectasis, left  No date: Venous insufficiency    Past Surgical History:  No date: Carpal tunnel release  No date:  section, classic      Comment:  x 2  No date: Colonoscopy  No date: Tonsillectomy  No date: Varicose vein surgery       Prior to Admission medications :  Medication levothyroxine 125 MCG tablet, Sig Take 1 tablet by mouth daily. Patient taking differently: Take 125 mcg by mouth daily. Takes every M, T, Th, F, and Sat, Start Date 3/10/22, End Date , Taking? Yes, Authorizing Provider Vineet Ojeda MD    Medication triamterene-hydroCHLOROthiazide (SVRJGEZ-04) 37.5-25 MG per tablet, Sig TAKE 1 TABLET BY MOUTH  EVERY DAY  Patient taking differently: Takes a 1/2 tablet daily, Start Date 21, End Date , Taking? Yes, Authorizing Provider Vineet Ojeda MD    Medication cetirizine (ZYRTEC) 10 MG tablet, Sig Take 10 mg by mouth every evening. , Start Date , End Date , Taking?  Yes, Authorizing Provider Outside Provider            Relevant Problems   No relevant active problems       Physical Exam     Airway   Mallampati: II  TM Distance: >3 FB  Neck ROM: Full  Neck: Non-tender and Able to place in sniff position  TMJ Mobility: Good    Cardiovascular  Cardiovascular exam normal  Cardio Rhythm: Regular  Cardio Rate: Normal    Head Assessment  Head assessment: Normocephalic and Atraumatic    General Assessment  General Assessment: Alert and oriented and No acute distress    Dental Exam  Dental exam normal    Pulmonary Exam  Pulmonary exam normal  Breath sounds clear to auscultation:  Yes    Abdominal Exam  Abdominal exam normal      Anesthesia Plan:    ASA Status: 2  Anesthesia Type: MAC    Induction: Intravenous    Post-op Pain Management: Per Surgeon      Checklist  Reviewed: Lab Results, EKG, Chest X-Rays, Consultations, Nursing Notes, Patient Summary, Beta Blocker Status, Outside Records, Pregnancy Test Results, Care Everywhere, DNR Status, NPO Status, Problem list, Medications, Allergies and Past Med History  Consent/Risks Discussed Statement:  The proposed anesthetic plan, including its risks and benefits, have been discussed with the Patient along with the risks and benefits of alternatives. Questions were encouraged and answered and the patient and/or representative understands and agrees to proceed. I discussed with the patient (and/or patient's legal representative) the risks and benefits of the proposed anesthesia plan, MAC, which may include services performed by other anesthesia providers. Alternative anesthesia plans, if available, were reviewed with the patient (and/or patient's legal representative). Discussion has been held with the patient (and/or patient's legal representative) regarding risks of anesthesia, which include Nausea, Allergic Reaction, Intra-operative Awareness, Vomiting, Dental Injury, Sore Throat, Headache, Hypotension, Aspiration and Nerve Injury and emergent situations that may require change in anesthesia plan. The patient (and/or patient's legal representative) has indicated understanding, his/her questions have been answered, and he/she wishes to proceed with the planned anesthetic. Blood Products: Not Anticipated    Comments  Plan Comments: R/B of MAC and GA discussed with patient.  Discussion included, but was not limited to, intraoperative awareness and airway obstruction for MAC, and intubation, aspiration, sore throat, postoperative pain and nausea for GA. Also discussed potential for serious perioperative complications such as hypoxemia, hypotension, and cerebrovascular or myocardial ischemia. Discussed that any and all measures necessary for care and safety of patient will be undertaken as needed. Pt voiced understanding of discussion as laid out. All questions answered. Will proceed with MAC anesthetic. Cared for during COVID-19 pandemic crisis. none

## 2022-12-14 ENCOUNTER — NON-APPOINTMENT (OUTPATIENT)
Age: 67
End: 2022-12-14

## 2022-12-14 ENCOUNTER — APPOINTMENT (OUTPATIENT)
Dept: OPHTHALMOLOGY | Facility: CLINIC | Age: 67
End: 2022-12-14

## 2022-12-14 ENCOUNTER — APPOINTMENT (OUTPATIENT)
Dept: PULMONOLOGY | Facility: CLINIC | Age: 67
End: 2022-12-14

## 2022-12-14 VITALS
HEART RATE: 78 BPM | OXYGEN SATURATION: 97 % | BODY MASS INDEX: 21.66 KG/M2 | SYSTOLIC BLOOD PRESSURE: 116 MMHG | DIASTOLIC BLOOD PRESSURE: 80 MMHG | TEMPERATURE: 96.3 F | RESPIRATION RATE: 17 BRPM | HEIGHT: 65 IN | WEIGHT: 130 LBS

## 2022-12-14 DIAGNOSIS — Z87.891 PERSONAL HISTORY OF NICOTINE DEPENDENCE: ICD-10-CM

## 2022-12-14 PROCEDURE — 94010 BREATHING CAPACITY TEST: CPT

## 2022-12-14 PROCEDURE — 95012 NITRIC OXIDE EXP GAS DETER: CPT

## 2022-12-14 PROCEDURE — 99204 OFFICE O/P NEW MOD 45 MIN: CPT | Mod: 25

## 2022-12-14 PROCEDURE — 94729 DIFFUSING CAPACITY: CPT

## 2022-12-14 PROCEDURE — 94727 GAS DIL/WSHOT DETER LNG VOL: CPT

## 2022-12-14 PROCEDURE — 92014 COMPRE OPH EXAM EST PT 1/>: CPT

## 2022-12-14 NOTE — PROCEDURE
[FreeTextEntry1] : PFT's performed in office show minimal obstructive lung defect. \par FEV1: 114% \par FEV1/FVC Ratio: 79% \par PMM87-33%: 118% \par DLCO: 109% \par \par Feno: 5 ppb; WNL.

## 2022-12-14 NOTE — HISTORY OF PRESENT ILLNESS
[Former] : former [< 20 pack-years] : < 20 pack-years [TextBox_4] : Ms. Montoya is a 67 year old, former remote/social smoking, female presenting to the office today to re-establish care at our office (last seen 2018). She has past medical history of Crohn's Disease, basal cell carcinoma of scalp, allergies, asthma, GERD, low vitamin D, and post nasal drip. \par \par Her chief concern is cough x 3 days s/p 2nd Shingles Vaccination. \par \par Patient states she had her second shingles vaccine on .  She states she awoke the next morning with fever and a 'bark like" cough that occurred when she took a deep breath in.  She states that has since subsided. \par \par She does admit to intermittent dry, asthmatic cough.  She notes it occurs once every 2 weeks or so.  She states recently she went to use her Albuterol to see if it be helpful, but it was . She does admit to feeling that she has postnasal drip and recently increased sinus pressure.  She notes she tested negative for COVID infection via rapid test. \par \par Patient is s/p Pfizer vaccination x 2 and then booster x 2, and most recently received 5th vaccination of Bi-Valent vaccination. \par She is s/p Pneumonia vaccination x 2 and received Flu vaccine for this year. \par \par She denies fever/chills, decreased appetite, increased fatigue, wheezing, SOB @ rest or exertion, or chest tightness.  [TextBox_11] : 0.25 [TextBox_13] : 4

## 2022-12-14 NOTE — ASSESSMENT
[FreeTextEntry1] : Ms. Montoya is a 67 year old, former remote/social smoking, female presenting to the office today to re-establish care at our office (last seen 5/2018). She has past medical history of Crohn's Disease, basal cell carcinoma of scalp, allergies, asthma, GERD, low vitamin D, and post nasal drip. Her chief concern is cough x 3 days s/p 2nd Shingles Vaccination - has since resolved.\par \par 1. Asthma:\par - Add Albuterol HFA 2 puffs Q6H. Advised if needing frequently she would benefit from maintenance inhaler therapy.\par \par 2. Post nasal drip:\par - Add Azelastine Nasal Spray. 2 squirts each nostril BID.\par \par Patient to follow up with Dr. Gardiner as scheduled.\par Patient to call with further questions and concerns.\par Patient verbalizes understanding of care plan and is agreeable.\par

## 2022-12-14 NOTE — REVIEW OF SYSTEMS
[Postnasal Drip] : postnasal drip [Sinus Problems] : sinus problems [Cough] : cough [Chest Tightness] : no chest tightness [Sputum] : no sputum [Dyspnea] : no dyspnea [Wheezing] : no wheezing [SOB on Exertion] : no sob on exertion [Negative] : Endocrine

## 2022-12-28 ENCOUNTER — NON-APPOINTMENT (OUTPATIENT)
Age: 67
End: 2022-12-28

## 2023-01-10 ENCOUNTER — APPOINTMENT (OUTPATIENT)
Dept: PULMONOLOGY | Facility: CLINIC | Age: 68
End: 2023-01-10
Payer: MEDICARE

## 2023-01-10 VITALS
OXYGEN SATURATION: 98 % | HEART RATE: 77 BPM | HEIGHT: 65 IN | TEMPERATURE: 97.1 F | WEIGHT: 130 LBS | DIASTOLIC BLOOD PRESSURE: 80 MMHG | SYSTOLIC BLOOD PRESSURE: 120 MMHG | RESPIRATION RATE: 16 BRPM | BODY MASS INDEX: 21.66 KG/M2

## 2023-01-10 PROCEDURE — 71046 X-RAY EXAM CHEST 2 VIEWS: CPT | Mod: 26

## 2023-01-10 PROCEDURE — 99214 OFFICE O/P EST MOD 30 MIN: CPT | Mod: 25

## 2023-01-10 NOTE — REVIEW OF SYSTEMS
[Cough] : cough [Chest Tightness] : no chest tightness [Sputum] : no sputum [Dyspnea] : no dyspnea [Wheezing] : no wheezing [SOB on Exertion] : no sob on exertion [Negative] : HEENT

## 2023-01-10 NOTE — ASSESSMENT
[FreeTextEntry1] : Ms. Montoya is a 67 year old, former remote/social smoking, female with past medical history of Crohn's Disease, basal cell carcinoma of scalp, allergies, asthma, GERD, low vitamin D, and post nasal drip here to follow up post covid and to discuss residual cough. \par \par #1.Cough- related to post viral cough syndrome\par -she is 80% improved overall- can take weeks to resolve at times, should see a difference over the next 2 weeks\par -CXR was normal, prior PFT was normal\par \par #2. Asthma/post viral bronchospasm\par - continue with Breo 200 1 puff once daily rinse and gargle\par -add trial of spiriva 1.25 2 puffs in am (1 month sample given to patient and educated on use)\par -add trial of singulair 10 mg PO QHS\par \par #3. Post nasal drip- reports stable/not present\par - has Azelastine Nasal Spray. 2 squirts each nostril BID- advised to pay attention to this if present. \par \par #4.GERD\par -reports stable with intermittent flares and associated cough but knows when the cough is related to GERD\par -continue on dexilant 60 mg in am \par -continue on Famotidine 40 QHS\par \par Patient to follow up with Dr. Gardiner or myself as scheduled or in 4-6 weeks\par Patient to call with further questions and concerns.\par Patient verbalizes understanding of care plan and is agreeable.\par

## 2023-01-10 NOTE — HISTORY OF PRESENT ILLNESS
[Former] : former [< 20 pack-years] : < 20 pack-years [TextBox_4] : INITIAL VISIT 2022:\par Ms. Montoya is a 67 year old, former remote/social smoking, female presenting to the office today to re-establish care at our office (last seen 2018). She has past medical history of Crohn's Disease, basal cell carcinoma of scalp, allergies, asthma, GERD, low vitamin D, and post nasal drip. \par \par Her chief concern is cough x 3 days s/p 2nd Shingles Vaccination. \par \par Patient states she had her second shingles vaccine on .  She states she awoke the next morning with fever and a 'bark like" cough that occurred when she took a deep breath in.  She states that has since subsided. \par \par She does admit to intermittent dry, asthmatic cough.  She notes it occurs once every 2 weeks or so.  She states recently she went to use her Albuterol to see if it be helpful, but it was . She does admit to feeling that she has postnasal drip and recently increased sinus pressure.  She notes she tested negative for COVID infection via rapid test. \par \par Patient is s/p Pfizer vaccination x 2 and then booster x 2, and most recently received 5th vaccination of Bi-Valent vaccination. \par She is s/p Pneumonia vaccination x 2 and received Flu vaccine for this year. \par \par She denies fever/chills, decreased appetite, increased fatigue, wheezing, SOB @ rest or exertion, or chest tightness. \par \par \par VISIT TODAY 1/10/2023\par Patient is in for follow-up to the above.\par Since that visit the patient did end up testing positive for COVID on . \par She opted not to take paxlovid. \par Since the visit and since COVID, she continues to deal with deep barking cough fits on and off. \par Overall her cough is 80% improved but the fits can happen at random. \par She has had this type of cough in the past but concerned that it is still lingering. \par Pt is compliant on the breo inhaler- could not tolerate he albuterol prior to Breo due to racy heart. \par She does not feel sick, she does not have SOB, wheezing, chest tightness or heaviness. \par She denies any sinus issues at present. \par She completed the prednisone taper. \par She feels her GERD is under control on her medications- dexilant and pepcid.  [TextBox_11] : 0.25 [TextBox_13] : 4

## 2023-01-31 ENCOUNTER — APPOINTMENT (OUTPATIENT)
Dept: PULMONOLOGY | Facility: CLINIC | Age: 68
End: 2023-01-31
Payer: MEDICARE

## 2023-01-31 VITALS
HEART RATE: 70 BPM | OXYGEN SATURATION: 98 % | RESPIRATION RATE: 16 BRPM | TEMPERATURE: 97 F | DIASTOLIC BLOOD PRESSURE: 80 MMHG | SYSTOLIC BLOOD PRESSURE: 110 MMHG | HEIGHT: 65 IN | BODY MASS INDEX: 21.66 KG/M2 | WEIGHT: 130 LBS

## 2023-01-31 PROCEDURE — 99214 OFFICE O/P EST MOD 30 MIN: CPT

## 2023-01-31 NOTE — PHYSICAL EXAM
[No Acute Distress] : no acute distress [Well Nourished] : well nourished [No Deformities] : no deformities [Normal Appearance] : normal appearance [No Neck Mass] : no neck mass [Normal Rate/Rhythm] : normal rate/rhythm [Normal S1, S2] : normal s1, s2 [No Murmurs] : no murmurs [No Resp Distress] : no resp distress [Clear to Auscultation Bilaterally] : clear to auscultation bilaterally [No Abnormalities] : no abnormalities [Normal Gait] : normal gait [No Clubbing] : no clubbing [No Cyanosis] : no cyanosis [No Edema] : no edema [FROM] : FROM [Normal Color/ Pigmentation] : normal color/ pigmentation [No Focal Deficits] : no focal deficits [Oriented x3] : oriented x3 [Normal Affect] : normal affect [Supple] : supple [No JVD] : no jvd [No Acc Muscle Use] : no acc muscle use [Normal Palpation] : normal palpation [Normal Rhythm and Effort] : normal rhythm and effort

## 2023-01-31 NOTE — ASSESSMENT
[FreeTextEntry1] : Ms. Montoya is a 67 year old, former remote/social smoking, female with past medical history of Crohn's Disease, basal cell carcinoma of scalp, allergies, asthma, GERD, low vitamin D, and post nasal drip here to follow up post covid and to discuss persistent residual cough. \par \par #1.Cough- related to post viral cough syndrome vs sensory neuropathic cough\par -she feels like she regressed overall with these symptoms-  can take weeks to resolve at times, should see a difference over the next 2 weeks\par -CXR was normal at last visit, prior PFT was normal\par -We discussed use of gabapentin in detail.  Patient is still weary about trying this would like to use this as a last resort.\par -If no improvement, we will consider a Dynamic CT scan on her next.\par \par #2. Asthma/post viral bronchospasm\par -Add trial of Trelegy 200 1 inhalation daily rinse and gargle after use instead of Breo and Spiriva, 1 month supply given to patient\par -Discontinue Singulair due to side effects\par -Add Accolate 20 mg twice daily as a trial since she had + symptom relief with LTRA but couldn’t tolerate the anxiety from singulair.\par -If no improvement by the end of the week, the patient can start a prednisone taper of 20 mg x 7 days, 10 mg x 7 days.  The patient already has this prescription at home.\par \par #3. Post nasal drip- reports stable/not present\par - has Azelastine Nasal Spray. 2 squirts each nostril BID- advised to pay attention to this if present. \par \par #4.GERD\par -reports stable with intermittent flares and associated cough but knows when the cough is related to GERD\par -continue on dexilant 60 mg in am \par -continue on Famotidine 40 QHS\par \par Patient to follow up as scheduled. \par Patient to call with further questions and concerns.\par Patient verbalizes understanding of care plan and is agreeable.\par

## 2023-01-31 NOTE — HISTORY OF PRESENT ILLNESS
[Former] : former [< 20 pack-years] : < 20 pack-years [TextBox_4] : INITIAL VISIT 2022:\par Ms. Montoya is a 67 year old, former remote/social smoking, female presenting to the office today to re-establish care at our office (last seen 2018). She has past medical history of Crohn's Disease, basal cell carcinoma of scalp, allergies, asthma, GERD, low vitamin D, and post nasal drip. \par \par Her chief concern is cough x 3 days s/p 2nd Shingles Vaccination. \par \par Patient states she had her second shingles vaccine on .  She states she awoke the next morning with fever and a 'bark like" cough that occurred when she took a deep breath in.  She states that has since subsided. \par \par She does admit to intermittent dry, asthmatic cough.  She notes it occurs once every 2 weeks or so.  She states recently she went to use her Albuterol to see if it be helpful, but it was . She does admit to feeling that she has postnasal drip and recently increased sinus pressure.  She notes she tested negative for COVID infection via rapid test. \par \par Patient is s/p Pfizer vaccination x 2 and then booster x 2, and most recently received 5th vaccination of Bi-Valent vaccination. \par She is s/p Pneumonia vaccination x 2 and received Flu vaccine for this year. \par \par She denies fever/chills, decreased appetite, increased fatigue, wheezing, SOB @ rest or exertion, or chest tightness. \par \par \par VISIT  1/10/2023\par Patient is in for follow-up to the above.\par Since that visit the patient did end up testing positive for COVID on . \par She opted not to take paxlovid. \par Since the visit and since COVID, she continues to deal with deep barking cough fits on and off. \par Overall her cough is 80% improved but the fits can happen at random. \par She has had this type of cough in the past but concerned that it is still lingering. \par Pt is compliant on the breo inhaler- could not tolerate he albuterol prior to Breo due to racy heart. \par She does not feel sick, she does not have SOB, wheezing, chest tightness or heaviness. \par She denies any sinus issues at present. \par She completed the prednisone taper. \par She feels her GERD is under control on her medications- dexilant and pepcid. \par \par \par VISIT TODAY 2023\par Pt is in for follow up to the above. \par Pt continues to have cough fits on and off. \par No cough at night since starting singulair but feels that this is making her anxious.  She reports being sensitive to medications.\par There are days where cough is much better however her lingering cough is making her miserable.\par \par She saw Dr. Perlman for ENT eval–who reported always clear with her vocal cords however there was some swelling noted around the area that he attributes to persistent cough. This swelling/irritation is likely what is causing her hoarse voice. No intervention necessary for that.  Patient states that he told her that she has long COVID symptoms.\par \par Patient is compliant on her medications including Breo and Spiriva.  Patient tends to cough after using Spiriva-gets into cough fits for 15 to 20 minutes but after an hour or so she is able to take a deep breath without coughing.\par \par She never uses steroids that were previously prescribed.\par She is weary about trying a drug like gabapentin due to the side effects listed. \par \par She reports of her cough was gone she would be feeling much better- aside from residual fatigue and the cough those are the only lingering symptoms. [TextBox_11] : 0.25 [TextBox_13] : 4

## 2023-01-31 NOTE — REVIEW OF SYSTEMS
[Cough] : cough [Negative] : Endocrine [Chest Tightness] : no chest tightness [Sputum] : no sputum [Dyspnea] : no dyspnea [Wheezing] : no wheezing [SOB on Exertion] : no sob on exertion

## 2023-02-07 ENCOUNTER — APPOINTMENT (OUTPATIENT)
Dept: PULMONOLOGY | Facility: CLINIC | Age: 68
End: 2023-02-07
Payer: MEDICARE

## 2023-02-07 VITALS
WEIGHT: 130 LBS | HEIGHT: 65 IN | BODY MASS INDEX: 21.66 KG/M2 | OXYGEN SATURATION: 98 % | RESPIRATION RATE: 16 BRPM | DIASTOLIC BLOOD PRESSURE: 80 MMHG | HEART RATE: 82 BPM | TEMPERATURE: 96.8 F | SYSTOLIC BLOOD PRESSURE: 120 MMHG

## 2023-02-07 PROCEDURE — 99214 OFFICE O/P EST MOD 30 MIN: CPT

## 2023-02-07 RX ORDER — ZAFIRLUKAST 20 MG/1
20 TABLET, COATED ORAL
Qty: 60 | Refills: 0 | Status: DISCONTINUED | COMMUNITY
Start: 2023-01-31 | End: 2023-02-07

## 2023-02-07 RX ORDER — FLUTICASONE FUROATE AND VILANTEROL TRIFENATATE 200; 25 UG/1; UG/1
200-25 POWDER RESPIRATORY (INHALATION)
Qty: 3 | Refills: 0 | Status: DISCONTINUED | COMMUNITY
Start: 2017-09-28 | End: 2023-02-07

## 2023-02-07 NOTE — REVIEW OF SYSTEMS
[Chest Tightness] : no chest tightness [Cough] : cough [Sputum] : no sputum [Dyspnea] : no dyspnea [Wheezing] : no wheezing [SOB on Exertion] : no sob on exertion [Negative] : Endocrine

## 2023-02-07 NOTE — ASSESSMENT
[FreeTextEntry1] : Ms. Montoya is a 67 year old, former remote/social smoking, female with past medical history of Crohn's Disease, basal cell carcinoma of scalp, allergies, asthma, GERD, low vitamin D, and post nasal drip here to follow up post covid and to discuss persistent residual cough. \par \par #1.Cough- related to post viral cough syndrome vs sensory neuropathic cough\par -cough has improved over all-much less severity/frequency\par -CXR was normal at last visit, prior PFT was normal\par -We discussed use of gabapentin in detail.  Patient is still weary about trying this would like to use this as a last resort only.\par -though there is improvement, move forward with Dynamic CT scan for the evaluation of tracheomalacia\par \par #2. Asthma/post viral bronchospasm\par -Patient was offered to be switched over to Symbicort and Spiriva given the voice hoarseness she is experiencing.  This voice hoarseness is likely due to powder based inhaler use as well as her current thrush.  Due to cost of medications and displeasure of needing to use 2 inhalers–patient would like to continue with therapy as is for now.\par -continue Trelegy 200 1 inhalation daily rinse and gargle after use; counseled the patient on really gargling her mouth and throat\par -restart Singulair 10 mg p.o. nightly\par -d/c Accolate\par \par #3. Post nasal drip- reports stable/not present\par - has Azelastine Nasal Spray. 2 squirts each nostril BID- advised to pay attention to this if present. \par \par #4.GERD\par -reports stable with intermittent flares and associated cough but knows when the cough is related to GERD\par -continue on dexilant 60 mg in am \par -continue on Famotidine 40 QHS\par \par #5.Oral thrush\par -Patient has fluconazole at home–patient to start/complete prescription\par \par Patient to follow up as scheduled. We will contact her regarding CT scan results when they are available.\par Patient to call with further questions and concerns.\par Patient verbalizes understanding of care plan and is agreeable.\par

## 2023-02-07 NOTE — PHYSICAL EXAM
[No Acute Distress] : no acute distress [Well Nourished] : well nourished [No Deformities] : no deformities [Normal Appearance] : normal appearance [Supple] : supple [No Neck Mass] : no neck mass [No JVD] : no jvd [Normal Rate/Rhythm] : normal rate/rhythm [Normal S1, S2] : normal s1, s2 [No Murmurs] : no murmurs [No Resp Distress] : no resp distress [No Acc Muscle Use] : no acc muscle use [Normal Palpation] : normal palpation [Normal Rhythm and Effort] : normal rhythm and effort [No Abnormalities] : no abnormalities [Clear to Auscultation Bilaterally] : clear to auscultation bilaterally [Normal Gait] : normal gait [No Clubbing] : no clubbing [No Cyanosis] : no cyanosis [No Edema] : no edema [FROM] : FROM [Normal Color/ Pigmentation] : normal color/ pigmentation [No Focal Deficits] : no focal deficits [Oriented x3] : oriented x3 [Normal Affect] : normal affect

## 2023-02-07 NOTE — HISTORY OF PRESENT ILLNESS
[Former] : former [< 20 pack-years] : < 20 pack-years [TextBox_4] : INITIAL VISIT 2022:\par Ms. Montoya is a 67 year old, former remote/social smoking, female presenting to the office today to re-establish care at our office (last seen 2018). She has past medical history of Crohn's Disease, basal cell carcinoma of scalp, allergies, asthma, GERD, low vitamin D, and post nasal drip. \par \par Her chief concern is cough x 3 days s/p 2nd Shingles Vaccination. \par \par Patient states she had her second shingles vaccine on .  She states she awoke the next morning with fever and a 'bark like" cough that occurred when she took a deep breath in.  She states that has since subsided. \par \par She does admit to intermittent dry, asthmatic cough.  She notes it occurs once every 2 weeks or so.  She states recently she went to use her Albuterol to see if it be helpful, but it was . She does admit to feeling that she has postnasal drip and recently increased sinus pressure.  She notes she tested negative for COVID infection via rapid test. \par \par Patient is s/p Pfizer vaccination x 2 and then booster x 2, and most recently received 5th vaccination of Bi-Valent vaccination. \par She is s/p Pneumonia vaccination x 2 and received Flu vaccine for this year. \par \par She denies fever/chills, decreased appetite, increased fatigue, wheezing, SOB @ rest or exertion, or chest tightness. \par \par \par VISIT  1/10/2023\par Patient is in for follow-up to the above.\par Since that visit the patient did end up testing positive for COVID on . \par She opted not to take paxlovid. \par Since the visit and since COVID, she continues to deal with deep barking cough fits on and off. \par Overall her cough is 80% improved but the fits can happen at random. \par She has had this type of cough in the past but concerned that it is still lingering. \par Pt is compliant on the breo inhaler- could not tolerate he albuterol prior to Breo due to racy heart. \par She does not feel sick, she does not have SOB, wheezing, chest tightness or heaviness. \par She denies any sinus issues at present. \par She completed the prednisone taper. \par She feels her GERD is under control on her medications- dexilant and pepcid. \par \par \par VISIT 2023\par Pt is in for follow up to the above. \par Pt continues to have cough fits on and off. \par No cough at night since starting singulair but feels that this is making her anxious.  She reports being sensitive to medications.\par There are days where cough is much better however her lingering cough is making her miserable.\par \par She saw Dr. Perlman for ENT eval–who reported always clear with her vocal cords however there was some swelling noted around the area that he attributes to persistent cough. This swelling/irritation is likely what is causing her hoarse voice. No intervention necessary for that.  Patient states that he told her that she has long COVID symptoms.\par \par Patient is compliant on her medications including Breo and Spiriva.  Patient tends to cough after using Spiriva-gets into cough fits for 15 to 20 minutes but after an hour or so she is able to take a deep breath without coughing.\par \par She never uses steroids that were previously prescribed.\par She is weary about trying a drug like gabapentin due to the side effects listed. \par \par She reports of her cough was gone she would be feeling much better- aside from residual fatigue and the cough those are the only lingering symptoms.\par \par VISIT TODAY 2023:\par Patient is in for follow-up to the above.\par Patient was unable to continue with Accolate.  She felt no difference in the cough.\par She went back to Singulair.\par At this point her cough has improved since she was last here.  Still has some fits of cough.\par Her voice has gotten more hoarse.  She has noticed an abnormal taste in her mouth.  She has noticed a white film on her tongue as well.  She has oral fluconazole at home for fungal infection.\par She reports her cough has not been as deep as it was.\par She is frustrated by the duration of this cough. [TextBox_11] : 0.25 [TextBox_13] : 4

## 2023-02-08 ENCOUNTER — APPOINTMENT (OUTPATIENT)
Dept: CT IMAGING | Facility: CLINIC | Age: 68
End: 2023-02-08
Payer: MEDICARE

## 2023-02-08 ENCOUNTER — OUTPATIENT (OUTPATIENT)
Dept: OUTPATIENT SERVICES | Facility: HOSPITAL | Age: 68
LOS: 1 days | End: 2023-02-08
Payer: MEDICARE

## 2023-02-08 DIAGNOSIS — R05.3 CHRONIC COUGH: ICD-10-CM

## 2023-02-08 PROCEDURE — 71250 CT THORAX DX C-: CPT | Mod: 26,MH

## 2023-02-08 PROCEDURE — 71250 CT THORAX DX C-: CPT

## 2023-02-13 ENCOUNTER — NON-APPOINTMENT (OUTPATIENT)
Age: 68
End: 2023-02-13

## 2023-02-16 ENCOUNTER — NON-APPOINTMENT (OUTPATIENT)
Age: 68
End: 2023-02-16

## 2023-02-22 ENCOUNTER — APPOINTMENT (OUTPATIENT)
Dept: PULMONOLOGY | Facility: CLINIC | Age: 68
End: 2023-02-22
Payer: MEDICARE

## 2023-02-22 VITALS
TEMPERATURE: 97.7 F | OXYGEN SATURATION: 96 % | HEART RATE: 82 BPM | SYSTOLIC BLOOD PRESSURE: 120 MMHG | DIASTOLIC BLOOD PRESSURE: 74 MMHG | HEIGHT: 65.5 IN | BODY MASS INDEX: 21.4 KG/M2 | WEIGHT: 130 LBS | RESPIRATION RATE: 16 BRPM

## 2023-02-22 DIAGNOSIS — U07.1 COVID-19: ICD-10-CM

## 2023-02-22 PROCEDURE — 99214 OFFICE O/P EST MOD 30 MIN: CPT | Mod: CS

## 2023-02-22 NOTE — PHYSICAL EXAM
[No Acute Distress] : no acute distress [Well Nourished] : well nourished [No Deformities] : no deformities [Normal Appearance] : normal appearance [Supple] : supple [No Neck Mass] : no neck mass [No JVD] : no jvd [Normal Rate/Rhythm] : normal rate/rhythm [Normal S1, S2] : normal s1, s2 [No Murmurs] : no murmurs [No Resp Distress] : no resp distress [No Acc Muscle Use] : no acc muscle use [Normal Palpation] : normal palpation [Normal Rhythm and Effort] : normal rhythm and effort [Clear to Auscultation Bilaterally] : clear to auscultation bilaterally [No Abnormalities] : no abnormalities [Normal Gait] : normal gait [No Clubbing] : no clubbing [No Cyanosis] : no cyanosis [No Edema] : no edema [FROM] : FROM [Normal Color/ Pigmentation] : normal color/ pigmentation [No Focal Deficits] : no focal deficits [Oriented x3] : oriented x3 [Normal Affect] : normal affect [Normal Mood] : normal mood [Remote Memory Normal] : remote memory normal [TextBox_11] : some white coating on tongue- no true white plaques noted, hoarse voice

## 2023-02-22 NOTE — ASSESSMENT
[FreeTextEntry1] : Ms. Montoya is a 67 year old, former remote/social smoking, female with past medical history of Crohn's Disease, basal cell carcinoma of scalp, allergies, asthma, GERD, low vitamin D, post nasal drip and recently dx'd tracheobronchomalacia by CT scan awaiting CTS evaluation for confirmation and bronchiectasis here to follow up post covid and to discuss persistent residual cough. \par \par #1.Cough-\par -cough has improved over all with much less incidence of cough and severity of cough\par \par #2.TBM?\par -pt has evaluation with Dr. Person tomorrow\par \par #3.Pulmonary nodules\par -likely inflammatory or infectious in nature- had COVID in Dec 2022\par -former smoking hx\par -pt will also discuss with \par -follow up CT scan in 3 months to assess for stability\par \par #4. Asthma/post viral bronchospasm-stable at this point\par -d/c Trelegy at this time due voice hoarseness\par -we will give the patient an inhaler break\par -if cough restarts with discontinuation of inhaler then the patient should restart an inhaler (Breztri samples given to patient and advised 1 puff am and pm with spacer to start, rinse and gargle after use)\par  Singulair 10 mg p.o. nightly\par \par #5.Mild bronchiectasis\par -Discussed Acapella use in great detail and instructed patient on how and when to use it.\par -will monitor on subsequent CT follow up imaging\par \par #6. Post nasal drip- reports stable/not present\par - has Azelastine Nasal Spray. 2 squirts each nostril BID- advised to pay attention to this if present. \par \par #7.GERD\par -reports stable with intermittent flares and associated cough but knows when the cough is related to GERD\par -continue on dexilant 60 mg in am \par -continue on Famotidine 40 QHS\par \par \par Patient to follow up in 4-5 weeks.\par Patient to call with further questions and concerns.\par Patient verbalizes understanding of care plan and is agreeable.\par

## 2023-02-22 NOTE — DISCUSSION/SUMMARY
[FreeTextEntry1] : CT results suspicious for tracheobronchomalacia (greater than 70% narrowing)\par -can have bronch/evaluation with CTS \par -mild bronchiectasis with some mucus impaction in the right upper lobe: Discussed use of aerobika or Acapella\par -Lung nodules largest measuring 5 mm–she will need to follow-up on this with a CT scan in 4 months\par -Mild coronary artery calcification\par \par I discussed in detail the etiology of tracheobronchomalacia her it was not life-threatening in any way\par -We discussed the pathophysiology of bronchiectasis -discussed early intervention, use of aerobika/Acapella\par

## 2023-02-22 NOTE — HISTORY OF PRESENT ILLNESS
[Former] : former [< 20 pack-years] : < 20 pack-years [TextBox_4] : INITIAL VISIT 2022:\par Ms. Montoya is a 67 year old, former remote/social smoking, female presenting to the office today to re-establish care at our office (last seen 2018). She has past medical history of Crohn's Disease, basal cell carcinoma of scalp, allergies, asthma, GERD, low vitamin D, and post nasal drip. \par \par Her chief concern is cough x 3 days s/p 2nd Shingles Vaccination. \par \par Patient states she had her second shingles vaccine on .  She states she awoke the next morning with fever and a 'bark like" cough that occurred when she took a deep breath in.  She states that has since subsided. \par \par She does admit to intermittent dry, asthmatic cough.  She notes it occurs once every 2 weeks or so.  She states recently she went to use her Albuterol to see if it be helpful, but it was . She does admit to feeling that she has postnasal drip and recently increased sinus pressure.  She notes she tested negative for COVID infection via rapid test. \par \par Patient is s/p Pfizer vaccination x 2 and then booster x 2, and most recently received 5th vaccination of Bi-Valent vaccination. \par She is s/p Pneumonia vaccination x 2 and received Flu vaccine for this year. \par \par She denies fever/chills, decreased appetite, increased fatigue, wheezing, SOB @ rest or exertion, or chest tightness. \par \par \par VISIT  1/10/2023\par Patient is in for follow-up to the above.\par Since that visit the patient did end up testing positive for COVID on . \par She opted not to take paxlovid. \par Since the visit and since COVID, she continues to deal with deep barking cough fits on and off. \par Overall her cough is 80% improved but the fits can happen at random. \par She has had this type of cough in the past but concerned that it is still lingering. \par Pt is compliant on the breo inhaler- could not tolerate he albuterol prior to Breo due to racy heart. \par She does not feel sick, she does not have SOB, wheezing, chest tightness or heaviness. \par She denies any sinus issues at present. \par She completed the prednisone taper. \par She feels her GERD is under control on her medications- dexilant and pepcid. \par \par \par VISIT 2023\par Pt is in for follow up to the above. \par Pt continues to have cough fits on and off. \par No cough at night since starting singulair but feels that this is making her anxious.  She reports being sensitive to medications.\par There are days where cough is much better however her lingering cough is making her miserable.\par \par She saw Dr. Perlman for ENT eval–who reported always clear with her vocal cords however there was some swelling noted around the area that he attributes to persistent cough. This swelling/irritation is likely what is causing her hoarse voice. No intervention necessary for that.  Patient states that he told her that she has long COVID symptoms.\par \par Patient is compliant on her medications including Breo and Spiriva.  Patient tends to cough after using Spiriva-gets into cough fits for 15 to 20 minutes but after an hour or so she is able to take a deep breath without coughing.\par \par She never uses steroids that were previously prescribed.\par She is weary about trying a drug like gabapentin due to the side effects listed. \par \par She reports of her cough was gone she would be feeling much better- aside from residual fatigue and the cough those are the only lingering symptoms.\par \par VISIT 2023:\par Patient is in for follow-up to the above.\par Patient was unable to continue with Accolate.  She felt no difference in the cough.\par She went back to Singulair.\par At this point her cough has improved since she was last here.  Still has some fits of cough.\par Her voice has gotten more hoarse.  She has noticed an abnormal taste in her mouth.  She has noticed a white film on her tongue as well.  She has oral fluconazole at home for fungal infection.\par She reports her cough has not been as deep as it was.\par She is frustrated by the duration of this cough.\par \par VISIT TODAY 2023:\par Pt is in for follow up to the above/ongoing cough. \par She was told over the phone that her CT scan was suspicious for tracheobronchomalacia (greater than 70% narrowing) and was referred to Dr. Person. \par Additionally:\par  -mild bronchiectasis with some mucus impaction in the right upper lobe: Discussed use of aerobika or Acapella\par -Lung nodules largest measuring 5 mm–she will need to follow-up on this with a CT scan in 4 months\par -Mild coronary artery calcification\par \par Over the phone I discussed in detail the etiology of tracheobronchomalacia her it was not life-threatening in any way\par -We discussed the pathophysiology of bronchiectasis -discussed early intervention, use of aerobika/Acapella\par \par Pt has planned appointment on  with Dr. Person. \par \par Overall the patient reports her cough is significantly better–she states she has "maybe 3 coughs a day"\par Patient continues to report hoarse voice.  She started an antifungal medication that she had at home previously prescribed to her.  She started the drug on Friday and continues to deal with hoarse voice and white coating on tongue.  Patient has had white coating on tongue in the past unrelated to thrush.\par \par Patient has a number of questions regarding CT scan results and diagnoses.\par Patient has brought in her Acapella today for education on how to use.\par \par No new complaints.\par \par  [TextBox_11] : 0.25 [TextBox_13] : 4

## 2023-02-22 NOTE — REVIEW OF SYSTEMS
[Cough] : cough [Negative] : Endocrine [Chest Tightness] : no chest tightness [Sputum] : no sputum [Dyspnea] : no dyspnea [Wheezing] : no wheezing [SOB on Exertion] : no sob on exertion [TextBox_14] : hoarse voice, white coating on tongue

## 2023-02-23 ENCOUNTER — NON-APPOINTMENT (OUTPATIENT)
Age: 68
End: 2023-02-23

## 2023-02-23 ENCOUNTER — APPOINTMENT (OUTPATIENT)
Dept: THORACIC SURGERY | Facility: CLINIC | Age: 68
End: 2023-02-23
Payer: MEDICARE

## 2023-02-23 VITALS
OXYGEN SATURATION: 100 % | HEART RATE: 71 BPM | SYSTOLIC BLOOD PRESSURE: 146 MMHG | WEIGHT: 130 LBS | HEIGHT: 65.5 IN | DIASTOLIC BLOOD PRESSURE: 87 MMHG | BODY MASS INDEX: 21.4 KG/M2

## 2023-02-23 DIAGNOSIS — R91.8 OTHER NONSPECIFIC ABNORMAL FINDING OF LUNG FIELD: ICD-10-CM

## 2023-02-23 PROCEDURE — 99205 OFFICE O/P NEW HI 60 MIN: CPT

## 2023-02-23 NOTE — PHYSICAL EXAM
[Fully active, able to carry on all pre-disease performance without restriction] : Status 0 - Fully active, able to carry on all pre-disease performance without restriction [General Appearance - Alert] : alert [General Appearance - In No Acute Distress] : in no acute distress [General Appearance - Well Nourished] : well nourished [Sclera] : the sclera and conjunctiva were normal [Extraocular Movements] : extraocular movements were intact [Outer Ear] : the ears and nose were normal in appearance [Hearing Threshold Finger Rub Not [place]] : hearing was normal [Neck Appearance] : the appearance of the neck was normal [Neck Cervical Mass (___cm)] : no neck mass was observed [] : no respiratory distress [Respiration, Rhythm And Depth] : normal respiratory rhythm and effort [Exaggerated Use Of Accessory Muscles For Inspiration] : no accessory muscle use [Auscultation Breath Sounds / Voice Sounds] : lungs were clear to auscultation bilaterally [Heart Rate And Rhythm] : heart rate was normal and rhythm regular [Examination Of The Chest] : the chest was normal in appearance [Chest Visual Inspection Thoracic Asymmetry] : no chest asymmetry [Diminished Respiratory Excursion] : normal chest expansion [2+] : left 2+ [Breast Appearance] : normal in appearance [Breast Palpation Mass] : no palpable masses [Bowel Sounds] : normal bowel sounds [Abdomen Soft] : soft [Abdomen Tenderness] : non-tender [External Female Genitalia] : normal external genitalia [Urethral Meatus] : normal urethra [Urinary Bladder Findings] : the bladder was normal on palpation [Cervical Lymph Nodes Enlarged Posterior Bilaterally] : posterior cervical [Cervical Lymph Nodes Enlarged Anterior Bilaterally] : anterior cervical [Supraclavicular Lymph Nodes Enlarged Bilaterally] : supraclavicular [No CVA Tenderness] : no ~M costovertebral angle tenderness [No Spinal Tenderness] : no spinal tenderness [Involuntary Movements] : no involuntary movements were seen [Skin Color & Pigmentation] : normal skin color and pigmentation [No Focal Deficits] : no focal deficits [Oriented To Time, Place, And Person] : oriented to person, place, and time

## 2023-02-24 NOTE — ASSESSMENT
[FreeTextEntry1] : Ms. JOSE LUIS ACUÑA, 67 year old female, former smoker, w/ hx of Crohn's disease, BCC of scalp, asthma, GERD. Contracted COVID in December, 2022 w/ persistent cough as well as voice hoarseness. s/p Steroids and now on inhalers \par \par PFTs on 12/14/2022: %, FEV1 114%, DLCO 109%. \par \par CT Chest Dynamic on 02/08/2023:\par - Mild bronchiectasis in both lungs. \par - Few mucous impacted bronchi in the right upper lobe. \par - Greater than 70% narrowing of the lumen of the trachea and right and left bronchi with forced exhalation. Left upper lobe 5 mm groundglass nodule (3-100). Few other sub-4 mm lung nodules, for example, adjacent to the oblique fissure in the right lower lobe (3-115) and in the apical right upper lobe (3-69). \par \par OF NOTE: Seen by ENT (Dr. Perlman) Patient reports vocal cords were normal. \par \par I have reviewed the patient's medical records and diagnostic images at time of this office consultation and have made the following recommendation:\par 1. Suggested awake bronchoscopy for further evaluation regarding TBM\par 2. Lung nodules demonstrated on CT Dynamic. Recommend repeating CT Chest in 1 year to re-evaluate stability\par 3. Patient endorses persistent hoarse voice. Discussed that this may be related to reflux. Recommend referring to GI for BRAVO testing. \par At this time, patient would like to consider all options. She will contact the office if she decides to proceed with any of the above recommendations. Continue follow up with Pulm, GI and ENT as per their recs. \par \par Recommendations reviewed with patient during this office visit, and all questions answered; Patient instructed on the importance of follow up and verbalizes understanding.\par \par I, KRUNAL Cerna, personally performed the evaluation and management (E/M) services for this new patient. That E/M includes conducting the initial examination, assessing all conditions, and establishing the plan of care. Today, My ACP, Ronit Garcia, was here to observe my evaluation and management services for this patient to be followed going forward.\par

## 2023-02-24 NOTE — DATA REVIEWED
[FreeTextEntry1] : I have independently reviewed patient's PFTs on 12/14/2022, CT chest on 02/08/2023

## 2023-02-24 NOTE — HISTORY OF PRESENT ILLNESS
[FreeTextEntry1] : Ms. JOSE LUIS ACUÑA, 67 year old female, former smoker, w/ hx of Crohn's disease, BCC of scalp, asthma, GERD. Contracted COVID in December, 2022 w/ persistent cough as well as voice hoarseness. s/p Steroids and now on inhalers \par \par PFTs on 12/14/2022: %, FEV1 114%, DLCO 109%. \par \par CT chest dynamic on 02/08/2023:\par - Mild bronchiectasis in both lungs. \par - Few mucous impacted bronchi in the right upper lobe. \par - Greater than 70% narrowing of the lumen of the trachea and right and left bronchi with forced exhalation. Left upper lobe 5 mm groundglass nodule (3-100). Few other sub-4 mm lung nodules, for example, adjacent to the oblique fissure in the right lower lobe (3-115) and in the apical right upper lobe (3-69). \par \par OF NOTE: Seen by ENT (Dr. Perlman) Patient reports vocal cords were normal. \par \par Patient is here today for CT surgery consultation. Referred by Dr. Hal Gardiner (Pulm) for tracheobronchomalacia and lung nodules. Today, patient endorsing that Cough is resolving; Not affecting quality of life; + persistent voice hoarseness, follows with ENT. No hemoptysis; No heart burn on Dexilant and Pepcid. Currently being treated for oral thrush.

## 2023-02-24 NOTE — CONSULT LETTER
[Dear  ___] : Dear  [unfilled], [Consult Letter:] : I had the pleasure of evaluating your patient, [unfilled]. [( Thank you for referring [unfilled] for consultation for _____ )] : Thank you for referring [unfilled] for consultation for [unfilled] [Please see my note below.] : Please see my note below. [Consult Closing:] : Thank you very much for allowing me to participate in the care of this patient.  If you have any questions, please do not hesitate to contact me. [Sincerely,] : Sincerely, [FreeTextEntry2] : Dr. Hal Gardiner (Pulm/Ref) [FreeTextEntry3] : Pranav Person MD, MPH \par System Director of Thoracic Surgery \par Director of Comprehensive Lung [facility] [place] \par Professor Cardiovascular & Thoracic Surgery \par [facility] [employer] [facility]\par \par

## 2023-03-20 ENCOUNTER — APPOINTMENT (OUTPATIENT)
Dept: MAMMOGRAPHY | Facility: CLINIC | Age: 68
End: 2023-03-20
Payer: MEDICARE

## 2023-03-20 ENCOUNTER — APPOINTMENT (OUTPATIENT)
Dept: ULTRASOUND IMAGING | Facility: CLINIC | Age: 68
End: 2023-03-20
Payer: MEDICARE

## 2023-03-20 PROCEDURE — 77063 BREAST TOMOSYNTHESIS BI: CPT

## 2023-03-20 PROCEDURE — 77067 SCR MAMMO BI INCL CAD: CPT

## 2023-03-20 PROCEDURE — 76641 ULTRASOUND BREAST COMPLETE: CPT | Mod: 50,GA

## 2023-03-22 ENCOUNTER — APPOINTMENT (OUTPATIENT)
Dept: PULMONOLOGY | Facility: CLINIC | Age: 68
End: 2023-03-22
Payer: MEDICARE

## 2023-03-22 ENCOUNTER — NON-APPOINTMENT (OUTPATIENT)
Age: 68
End: 2023-03-22

## 2023-03-22 VITALS
WEIGHT: 130 LBS | HEART RATE: 80 BPM | DIASTOLIC BLOOD PRESSURE: 70 MMHG | RESPIRATION RATE: 16 BRPM | BODY MASS INDEX: 21.66 KG/M2 | SYSTOLIC BLOOD PRESSURE: 118 MMHG | OXYGEN SATURATION: 98 % | HEIGHT: 65 IN | TEMPERATURE: 97.6 F

## 2023-03-22 DIAGNOSIS — R05.3 CHRONIC COUGH: ICD-10-CM

## 2023-03-22 DIAGNOSIS — R05.8 OTHER SPECIFIED COUGH: ICD-10-CM

## 2023-03-22 PROCEDURE — 94010 BREATHING CAPACITY TEST: CPT

## 2023-03-22 PROCEDURE — 99213 OFFICE O/P EST LOW 20 MIN: CPT | Mod: 25

## 2023-03-22 NOTE — HISTORY OF PRESENT ILLNESS
[Former] : former [< 20 pack-years] : < 20 pack-years [TextBox_4] : INITIAL VISIT 2022:\par Ms. Montoya is a 67 year old, former remote/social smoking, female presenting to the office today to re-establish care at our office (last seen 2018). She has past medical history of Crohn's Disease, basal cell carcinoma of scalp, allergies, asthma, GERD, low vitamin D, and post nasal drip. \par \par Her chief concern is cough x 3 days s/p 2nd Shingles Vaccination. \par \par Patient states she had her second shingles vaccine on .  She states she awoke the next morning with fever and a 'bark like" cough that occurred when she took a deep breath in.  She states that has since subsided. \par \par She does admit to intermittent dry, asthmatic cough.  She notes it occurs once every 2 weeks or so.  She states recently she went to use her Albuterol to see if it be helpful, but it was . She does admit to feeling that she has postnasal drip and recently increased sinus pressure.  She notes she tested negative for COVID infection via rapid test. \par \par Patient is s/p Pfizer vaccination x 2 and then booster x 2, and most recently received 5th vaccination of Bi-Valent vaccination. \par She is s/p Pneumonia vaccination x 2 and received Flu vaccine for this year. \par \par She denies fever/chills, decreased appetite, increased fatigue, wheezing, SOB @ rest or exertion, or chest tightness. \par \par \par VISIT  1/10/2023\par Patient is in for follow-up to the above.\par Since that visit the patient did end up testing positive for COVID on . \par She opted not to take paxlovid. \par Since the visit and since COVID, she continues to deal with deep barking cough fits on and off. \par Overall her cough is 80% improved but the fits can happen at random. \par She has had this type of cough in the past but concerned that it is still lingering. \par Pt is compliant on the breo inhaler- could not tolerate he albuterol prior to Breo due to racy heart. \par She does not feel sick, she does not have SOB, wheezing, chest tightness or heaviness. \par She denies any sinus issues at present. \par She completed the prednisone taper. \par She feels her GERD is under control on her medications- dexilant and pepcid. \par \par \par VISIT 2023\par Pt is in for follow up to the above. \par Pt continues to have cough fits on and off. \par No cough at night since starting singulair but feels that this is making her anxious.  She reports being sensitive to medications.\par There are days where cough is much better however her lingering cough is making her miserable.\par \par She saw Dr. Perlman for ENT eval–who reported always clear with her vocal cords however there was some swelling noted around the area that he attributes to persistent cough. This swelling/irritation is likely what is causing her hoarse voice. No intervention necessary for that.  Patient states that he told her that she has long COVID symptoms.\par \par Patient is compliant on her medications including Breo and Spiriva.  Patient tends to cough after using Spiriva-gets into cough fits for 15 to 20 minutes but after an hour or so she is able to take a deep breath without coughing.\par \par She never uses steroids that were previously prescribed.\par She is weary about trying a drug like gabapentin due to the side effects listed. \par \par She reports of her cough was gone she would be feeling much better- aside from residual fatigue and the cough those are the only lingering symptoms.\par \par VISIT 2023:\par Patient is in for follow-up to the above.\par Patient was unable to continue with Accolate.  She felt no difference in the cough.\par She went back to Singulair.\par At this point her cough has improved since she was last here.  Still has some fits of cough.\par Her voice has gotten more hoarse.  She has noticed an abnormal taste in her mouth.  She has noticed a white film on her tongue as well.  She has oral fluconazole at home for fungal infection.\par She reports her cough has not been as deep as it was.\par She is frustrated by the duration of this cough.\par \par VISIT 2023:\par Pt is in for follow up to the above/ongoing cough. \par She was told over the phone that her CT scan was suspicious for tracheobronchomalacia (greater than 70% narrowing) and was referred to Dr. Person. \par Additionally:\par  -mild bronchiectasis with some mucus impaction in the right upper lobe: Discussed use of aerobika or Acapella\par -Lung nodules largest measuring 5 mm–she will need to follow-up on this with a CT scan in 4 months\par -Mild coronary artery calcification\par \par Over the phone I discussed in detail the etiology of tracheobronchomalacia her it was not life-threatening in any way\par -We discussed the pathophysiology of bronchiectasis -discussed early intervention, use of aerobika/Acapella\par \par Pt has planned appointment on  with Dr. Person. \par \par Overall the patient reports her cough is significantly better–she states she has "maybe 3 coughs a day"\par Patient continues to report hoarse voice.  She started an antifungal medication that she had at home previously prescribed to her.  She started the drug on Friday and continues to deal with hoarse voice and white coating on tongue.  Patient has had white coating on tongue in the past unrelated to thrush.\par \par Patient has a number of questions regarding CT scan results and diagnoses.\par Patient has brought in her Acapella today for education on how to use.\par \par No new complaints.\par \par VISIT TODAY 3/22/2023:\par Pt is in for routine follow up.\par She is no longer on inhalers and has been doing well over all. \par She thought she was completely better until yesterday when she had a cough fit x 10 min, admittedly is unsure if she is dealing with severe acid reflux or thrush.  She has a very bad taste in her mouth and feels the sensation of reflux in her esophagus.  The cough fit happened after having ginger ale and chopped liver and being in a supine position.\par \par She was concerned about thrush because she did not think that she could still be dealing with symptoms despite being on Dexilant 60 mg and famotidine 40 mg.\par She has not yet followed up with her GI MD\par She is status post an antifungal medication a couple weeks ago as a trial\par She states that Dr. Person also felt acid reflux was the main contributor to her cough and voice issues.\par \par She has been seeing acupuncture for her hoarse voice/cough-feels that it has helped her tremendously\par \par She would like to know if she can try half a dose of Singulair in the morning and take the other half at night.\par She is not dealing with any cough at night and wonders if it is at all related to Singulair.\par \par No new complaints.\par \par \par  [TextBox_11] : 0.25 [TextBox_13] : 4

## 2023-03-22 NOTE — REVIEW OF SYSTEMS
[GERD] : gerd [Negative] : HEENT [Chest Tightness] : no chest tightness [Sputum] : no sputum [Dyspnea] : no dyspnea [Wheezing] : no wheezing [SOB on Exertion] : no sob on exertion [TextBox_30] : sporadic random cough episode

## 2023-03-22 NOTE — ASSESSMENT
[FreeTextEntry1] : Ms. Montoya is a 67 year old, former remote/social smoking, female with past medical history of Crohn's Disease, basal cell carcinoma of scalp, allergies, asthma, GERD, low vitamin D, post nasal drip and recently dx'd tracheobronchomalacia by CT scan  here to follow up post covid and to discuss persistent residual cough that has largely resolved but with possible acid reflux contributing to her overall symptoms.\par The plan for the patient is as follows:\par \par #1.Cough-\par -cough has improved over all, few episodes of cough fits randomly\par -?acid reflux related\par \par #2.TBM?\par -saw Dr. Person, pt was not interested in bronch at this point in time\par \par #3.Pulmonary nodules\par -likely inflammatory or infectious in nature- had COVID in Dec 2022\par -former smoking hx\par -pt will also discuss with \par -follow up CT scan in 3 months to assess for stability, rx given\par \par #4. Asthma/post viral bronchospasm-stable at this point\par -d/c Trelegy at this time due voice hoarseness\par -we will give the patient an inhaler break\par -if cough restarts with discontinuation of inhaler then the patient should restart an inhaler (Breztri samples given to patient and advised 1 puff am and pm with spacer to start, rinse and gargle after use)\par -Singulair 10 mg p.o. nightly or can try taking 1/2 tab am and pm as discussed- unsure how much benefit she will get for the day\par \par #5.Mild bronchiectasis\par -Discussed Acapella use in great detail and instructed patient on how and when to use it.\par -will monitor on subsequent CT follow up imaging\par \par #6. Post nasal drip- reports stable/not present\par - has Azelastine Nasal Spray. 2 squirts each nostril BID- advised to pay attention to this if present. \par \par #7.GERD\par -reports intermittent flares and can feel symptoms\par -i suggested she see her GI MD to re-evaluate the severity of her reflux \par -continue on dexilant 60 mg in am \par -continue on Famotidine 40 QHS\par -she may need to consider an anti reflux diet to completely treat\par \par Patient to follow up in 2-3 months\par Patient to call with further questions and concerns.\par Patient verbalizes understanding of care plan and is agreeable.\par

## 2023-03-22 NOTE — PHYSICAL EXAM
[No Acute Distress] : no acute distress [Well Nourished] : well nourished [No Deformities] : no deformities [Normal Appearance] : normal appearance [Supple] : supple [No Neck Mass] : no neck mass [No JVD] : no jvd [Normal Rate/Rhythm] : normal rate/rhythm [Normal S1, S2] : normal s1, s2 [No Murmurs] : no murmurs [No Resp Distress] : no resp distress [No Acc Muscle Use] : no acc muscle use [Normal Palpation] : normal palpation [Normal Rhythm and Effort] : normal rhythm and effort [Clear to Auscultation Bilaterally] : clear to auscultation bilaterally [No Abnormalities] : no abnormalities [Normal Gait] : normal gait [No Clubbing] : no clubbing [No Cyanosis] : no cyanosis [No Edema] : no edema [FROM] : FROM [Normal Color/ Pigmentation] : normal color/ pigmentation [No Focal Deficits] : no focal deficits [Oriented x3] : oriented x3 [Normal Mood] : normal mood [Normal Affect] : normal affect [Remote Memory Normal] : remote memory normal [Normal Oropharynx] : normal oropharynx

## 2023-04-12 ENCOUNTER — NON-APPOINTMENT (OUTPATIENT)
Age: 68
End: 2023-04-12

## 2023-04-17 LAB
RAPID RVP RESULT: NOT DETECTED
SARS-COV-2 RNA PNL RESP NAA+PROBE: NOT DETECTED

## 2023-04-19 ENCOUNTER — APPOINTMENT (OUTPATIENT)
Dept: PULMONOLOGY | Facility: CLINIC | Age: 68
End: 2023-04-19
Payer: MEDICARE

## 2023-04-19 VITALS
BODY MASS INDEX: 21.66 KG/M2 | HEART RATE: 78 BPM | WEIGHT: 130 LBS | OXYGEN SATURATION: 97 % | TEMPERATURE: 97.5 F | SYSTOLIC BLOOD PRESSURE: 112 MMHG | DIASTOLIC BLOOD PRESSURE: 70 MMHG | RESPIRATION RATE: 16 BRPM | HEIGHT: 65 IN

## 2023-04-19 DIAGNOSIS — B37.0 CANDIDAL STOMATITIS: ICD-10-CM

## 2023-04-19 DIAGNOSIS — R91.8 OTHER NONSPECIFIC ABNORMAL FINDING OF LUNG FIELD: ICD-10-CM

## 2023-04-19 PROCEDURE — 99214 OFFICE O/P EST MOD 30 MIN: CPT

## 2023-04-19 NOTE — ASSESSMENT
[FreeTextEntry1] : Ms. Montoya is a 67 year old, former remote/social smoking, female with past medical history of Crohn's Disease, basal cell carcinoma of scalp, allergies, asthma, GERD, low vitamin D, post nasal drip and recently dx'd tracheobronchomalacia by CT scan  here to follow up on recent bronchitis and her acute on chronic cough.\par The plan for the patient is as follows:\par \par #1.Cough-\par -improving from the recent bronchitis after prednisone\par -?acid reflux related: evals pending\par -consider jackhammer esophagus/achalasia?; esophageal spasms may definitely be contributing- consider increasing the desipramine as a trial. \par -discussed association with TBM\par -discussed association with bronchiectasis\par \par #2.TBM\par -saw Dr. Person, pt was not interested in bronch at this point in time\par \par #3.Pulmonary nodules\par -likely inflammatory or infectious in nature- had COVID in Dec 2022\par -former smoking hx\par -pt will also discuss with \par -follow up CT scan in 3 months to assess for stability, rx given\par \par #4. Asthma/post viral bronchospasm- was stable, now with cough\par -was on breztri but now dc'd due to possible thrush\par -we will give the patient an inhaler break and treat\par -Singulair 10 mg p.o. nightly or can try taking 1/2 tab am and pm as discussed- unsure how much benefit she will get for the day\par \par #5.Mild bronchiectasis\par -Discussed Acapella use in great detail and instructed patient on how and when to use it.\par -will monitor on subsequent CT follow up imaging\par \par #6. Post nasal drip- reports stable/not present\par - has Azelastine Nasal Spray. 2 squirts each nostril BID- advised to pay attention to this if present. \par \par #7.GERD\par -reports intermittent flares and can feel symptoms\par -i suggested she see her GI MD to re-evaluate the severity of her reflux/has this planned\par -continue on dexilant 60 mg in am \par -continue on Famotidine 40 QHS\par -now on anti reflux diet\par \par #8.Oral thrush\par -fluconozole 200 mg x 1d, then 100 mg x 14 days; should see improvement over the week with thrush- if not, consider stopping fluconozole. \par \par Patient to follow up in 2-3 months\par Patient to call with further questions and concerns.\par Patient verbalizes understanding of care plan and is agreeable.\par

## 2023-04-19 NOTE — REVIEW OF SYSTEMS
[Cough] : cough [Chest Tightness] : no chest tightness [Sputum] : no sputum [Dyspnea] : no dyspnea [Wheezing] : no wheezing [SOB on Exertion] : no sob on exertion [GERD] : gerd [Negative] : Endocrine

## 2023-04-19 NOTE — HISTORY OF PRESENT ILLNESS
[Former] : former [< 20 pack-years] : < 20 pack-years [TextBox_4] : INITIAL VISIT 2022:\par Ms. Montoya is a 67 year old, former remote/social smoking, female presenting to the office today to re-establish care at our office (last seen 2018). She has past medical history of Crohn's Disease, basal cell carcinoma of scalp, allergies, asthma, GERD, low vitamin D, and post nasal drip. \par \par Her chief concern is cough x 3 days s/p 2nd Shingles Vaccination. \par \par Patient states she had her second shingles vaccine on .  She states she awoke the next morning with fever and a 'bark like" cough that occurred when she took a deep breath in.  She states that has since subsided. \par \par She does admit to intermittent dry, asthmatic cough.  She notes it occurs once every 2 weeks or so.  She states recently she went to use her Albuterol to see if it be helpful, but it was . She does admit to feeling that she has postnasal drip and recently increased sinus pressure.  She notes she tested negative for COVID infection via rapid test. \par \par Patient is s/p Pfizer vaccination x 2 and then booster x 2, and most recently received 5th vaccination of Bi-Valent vaccination. \par She is s/p Pneumonia vaccination x 2 and received Flu vaccine for this year. \par \par She denies fever/chills, decreased appetite, increased fatigue, wheezing, SOB @ rest or exertion, or chest tightness. \par \par \par VISIT  1/10/2023\par Patient is in for follow-up to the above.\par Since that visit the patient did end up testing positive for COVID on . \par She opted not to take paxlovid. \par Since the visit and since COVID, she continues to deal with deep barking cough fits on and off. \par Overall her cough is 80% improved but the fits can happen at random. \par She has had this type of cough in the past but concerned that it is still lingering. \par Pt is compliant on the breo inhaler- could not tolerate he albuterol prior to Breo due to racy heart. \par She does not feel sick, she does not have SOB, wheezing, chest tightness or heaviness. \par She denies any sinus issues at present. \par She completed the prednisone taper. \par She feels her GERD is under control on her medications- dexilant and pepcid. \par \par \par VISIT 2023\par Pt is in for follow up to the above. \par Pt continues to have cough fits on and off. \par No cough at night since starting singulair but feels that this is making her anxious.  She reports being sensitive to medications.\par There are days where cough is much better however her lingering cough is making her miserable.\par \par She saw Dr. Perlman for ENT eval–who reported always clear with her vocal cords however there was some swelling noted around the area that he attributes to persistent cough. This swelling/irritation is likely what is causing her hoarse voice. No intervention necessary for that.  Patient states that he told her that she has long COVID symptoms.\par \par Patient is compliant on her medications including Breo and Spiriva.  Patient tends to cough after using Spiriva-gets into cough fits for 15 to 20 minutes but after an hour or so she is able to take a deep breath without coughing.\par \par She never uses steroids that were previously prescribed.\par She is weary about trying a drug like gabapentin due to the side effects listed. \par \par She reports of her cough was gone she would be feeling much better- aside from residual fatigue and the cough those are the only lingering symptoms.\par \par VISIT 2023:\par Patient is in for follow-up to the above.\par Patient was unable to continue with Accolate.  She felt no difference in the cough.\par She went back to Singulair.\par At this point her cough has improved since she was last here.  Still has some fits of cough.\par Her voice has gotten more hoarse.  She has noticed an abnormal taste in her mouth.  She has noticed a white film on her tongue as well.  She has oral fluconazole at home for fungal infection.\par She reports her cough has not been as deep as it was.\par She is frustrated by the duration of this cough.\par \par VISIT 2023:\par Pt is in for follow up to the above/ongoing cough. \par She was told over the phone that her CT scan was suspicious for tracheobronchomalacia (greater than 70% narrowing) and was referred to Dr. Person. \par Additionally:\par  -mild bronchiectasis with some mucus impaction in the right upper lobe: Discussed use of aerobika or Acapella\par -Lung nodules largest measuring 5 mm–she will need to follow-up on this with a CT scan in 4 months\par -Mild coronary artery calcification\par \par Over the phone I discussed in detail the etiology of tracheobronchomalacia her it was not life-threatening in any way\par -We discussed the pathophysiology of bronchiectasis -discussed early intervention, use of aerobika/Acapella\par \par Pt has planned appointment on  with Dr. Person. \par \par Overall the patient reports her cough is significantly better–she states she has "maybe 3 coughs a day"\par Patient continues to report hoarse voice.  She started an antifungal medication that she had at home previously prescribed to her.  She started the drug on Friday and continues to deal with hoarse voice and white coating on tongue.  Patient has had white coating on tongue in the past unrelated to thrush.\par \par Patient has a number of questions regarding CT scan results and diagnoses.\par Patient has brought in her Acapella today for education on how to use.\par \par No new complaints.\par \par VISIT  3/22/2023:\par Pt is in for routine follow up.\par She is no longer on inhalers and has been doing well over all. \par She thought she was completely better until yesterday when she had a cough fit x 10 min, admittedly is unsure if she is dealing with severe acid reflux or thrush.  She has a very bad taste in her mouth and feels the sensation of reflux in her esophagus.  The cough fit happened after having ginger ale and chopped liver and being in a supine position.\par \par She was concerned about thrush because she did not think that she could still be dealing with symptoms despite being on Dexilant 60 mg and famotidine 40 mg.\par She has not yet followed up with her GI MD\par She is status post an antifungal medication a couple weeks ago as a trial\par She states that Dr. Person also felt acid reflux was the main contributor to her cough and voice issues.\par \par She has been seeing acupuncture for her hoarse voice/cough-feels that it has helped her tremendously\par \par She would like to know if she can try half a dose of Singulair in the morning and take the other half at night.\par She is not dealing with any cough at night and wonders if it is at all related to Singulair.\par \par No new complaints.\par \par \par VISIT TODAY 2023:\par Pt is in for f/u.\par Last week the patient was not feeling well-saw ENT and was diagnosed with URI/bronchitis.  She was placed on Zithromax 500 x 5 days and is well as prednisone 20 mg x 5 days then 10 mg x 5 days.  She reports Dr. Perlman saw mucus/pus in her throat and felt her throat was inflamed.\par At present she has improved overall however continues to deal with deep bark-like cough that seems to worsen as the day goes on.\par Through the night however symptoms seem to dissipate.  She feels her desipramine, Singulair, Pepcid and NyQuil combination helps her.\par \par Since being back on the inhaler, she feels like she has thrush again because her tongue has a white coating.  She discontinued Breztri due to this.  She did not feel that Breztri was helping her much any way.\par \par She has plans to see her GI doctor: Dr. Win and has plans to see ENT: Dr. Parson as a new patient as he specializes in throat disorder/acid reflux/LPR.\par \par Patient cannot decide where her symptoms are coming from as she does feel her reflux does definitely play a part in this.  She recently got on a diet that is very strict as she is avoiding anything that can be acid producing.\par \par She notes a history of esophageal spasms and used to be on 3 to 4 tablets of desipramine at night.  She has been titrated down to 1 desipramine at night and wonders if she needs to go back and increase the dose.  She will discuss this with her GI doctor next week.\par \par Her ENT visit is planned for May 1.\par \par We rediscussed her bronchiectasis- which is very mild, and the part that tracheobronchomalacia can play in this cough.\par  [TextBox_13] : 4 [TextBox_11] : 0.25

## 2023-04-19 NOTE — PHYSICAL EXAM
[No Acute Distress] : no acute distress [Well Nourished] : well nourished [No Deformities] : no deformities [Normal Appearance] : normal appearance [Supple] : supple [No Neck Mass] : no neck mass [No JVD] : no jvd [Normal Rate/Rhythm] : normal rate/rhythm [Normal S1, S2] : normal s1, s2 [No Murmurs] : no murmurs [No Resp Distress] : no resp distress [No Acc Muscle Use] : no acc muscle use [Normal Palpation] : normal palpation [Normal Rhythm and Effort] : normal rhythm and effort [Clear to Auscultation Bilaterally] : clear to auscultation bilaterally [Normal Gait] : normal gait [No Abnormalities] : no abnormalities [No Clubbing] : no clubbing [No Cyanosis] : no cyanosis [No Edema] : no edema [FROM] : FROM [Normal Color/ Pigmentation] : normal color/ pigmentation [No Focal Deficits] : no focal deficits [Oriented x3] : oriented x3 [Normal Mood] : normal mood [Normal Affect] : normal affect [Remote Memory Normal] : remote memory normal [TextBox_11] : + for white coating on tongue

## 2023-05-04 ENCOUNTER — APPOINTMENT (OUTPATIENT)
Dept: PULMONOLOGY | Facility: CLINIC | Age: 68
End: 2023-05-04
Payer: MEDICARE

## 2023-05-04 ENCOUNTER — LABORATORY RESULT (OUTPATIENT)
Age: 68
End: 2023-05-04

## 2023-05-04 VITALS
WEIGHT: 127 LBS | HEIGHT: 65 IN | DIASTOLIC BLOOD PRESSURE: 80 MMHG | SYSTOLIC BLOOD PRESSURE: 130 MMHG | RESPIRATION RATE: 16 BRPM | HEART RATE: 74 BPM | BODY MASS INDEX: 21.16 KG/M2 | OXYGEN SATURATION: 98 % | TEMPERATURE: 96.9 F

## 2023-05-04 DIAGNOSIS — Z72.820 SLEEP DEPRIVATION: ICD-10-CM

## 2023-05-04 LAB
25(OH)D3 SERPL-MCNC: 38.2 NG/ML
BASOPHILS # BLD AUTO: 0.04 K/UL
BASOPHILS NFR BLD AUTO: 0.6 %
EOSINOPHIL # BLD AUTO: 0.12 K/UL
EOSINOPHIL NFR BLD AUTO: 1.7 %
HCT VFR BLD CALC: 40 %
HGB BLD-MCNC: 12.5 G/DL
IMM GRANULOCYTES NFR BLD AUTO: 0.4 %
LYMPHOCYTES # BLD AUTO: 2.46 K/UL
LYMPHOCYTES NFR BLD AUTO: 33.9 %
MAN DIFF?: NORMAL
MCHC RBC-ENTMCNC: 28.3 PG
MCHC RBC-ENTMCNC: 31.3 GM/DL
MCV RBC AUTO: 90.5 FL
MONOCYTES # BLD AUTO: 0.44 K/UL
MONOCYTES NFR BLD AUTO: 6.1 %
NEUTROPHILS # BLD AUTO: 4.17 K/UL
NEUTROPHILS NFR BLD AUTO: 57.3 %
PLATELET # BLD AUTO: 261 K/UL
RBC # BLD: 4.42 M/UL
RBC # FLD: 12.9 %
WBC # FLD AUTO: 7.26 K/UL

## 2023-05-04 PROCEDURE — 99214 OFFICE O/P EST MOD 30 MIN: CPT | Mod: 25

## 2023-05-04 PROCEDURE — 94729 DIFFUSING CAPACITY: CPT

## 2023-05-04 PROCEDURE — 94727 GAS DIL/WSHOT DETER LNG VOL: CPT

## 2023-05-04 PROCEDURE — 94010 BREATHING CAPACITY TEST: CPT

## 2023-05-04 PROCEDURE — 95012 NITRIC OXIDE EXP GAS DETER: CPT

## 2023-05-04 RX ORDER — FLUTICASONE FUROATE AND VILANTEROL TRIFENATATE 200; 25 UG/1; UG/1
200-25 POWDER RESPIRATORY (INHALATION) DAILY
Qty: 1 | Refills: 5 | Status: DISCONTINUED | COMMUNITY
Start: 2018-05-29 | End: 2023-05-04

## 2023-05-04 RX ORDER — PREDNISONE 10 MG/1
10 TABLET ORAL
Qty: 6 | Refills: 0 | Status: DISCONTINUED | COMMUNITY
Start: 2022-12-28 | End: 2023-05-04

## 2023-05-04 RX ORDER — PREDNISONE 10 MG/1
10 TABLET ORAL
Qty: 21 | Refills: 0 | Status: DISCONTINUED | COMMUNITY
Start: 2022-12-19 | End: 2023-05-04

## 2023-05-04 RX ORDER — PROMETHAZINE HYDROCHLORIDE AND DEXTROMETHORPHAN HYDROBROMIDE ORAL SOLUTION 15; 6.25 MG/5ML; MG/5ML
6.25-15 SOLUTION ORAL
Qty: 240 | Refills: 0 | Status: DISCONTINUED | COMMUNITY
Start: 2023-04-13 | End: 2023-05-04

## 2023-05-04 RX ORDER — AZELASTINE HYDROCHLORIDE AND FLUTICASONE PROPIONATE 137; 50 UG/1; UG/1
137-50 SPRAY, METERED NASAL
Qty: 3 | Refills: 1 | Status: ACTIVE | COMMUNITY
Start: 2023-05-04 | End: 1900-01-01

## 2023-05-04 RX ORDER — PREDNISONE 10 MG/1
10 TABLET ORAL
Qty: 21 | Refills: 0 | Status: DISCONTINUED | COMMUNITY
Start: 2023-01-26 | End: 2023-05-04

## 2023-05-04 RX ORDER — AZITHROMYCIN 500 MG/1
500 TABLET, FILM COATED ORAL DAILY
Qty: 5 | Refills: 0 | Status: DISCONTINUED | COMMUNITY
Start: 2018-08-08 | End: 2023-05-04

## 2023-05-04 NOTE — ADDENDUM
[FreeTextEntry1] : Documented by Ignacio Aragon acting as a scribe for Dr. Hal Gardiner on 05/04/2023.\par \par All medical record entries made by the Scribe were at my, Dr. Hal Gardiner's, direction and personally dictated by me on 05/04/2023. I have reviewed the chart and agree that the record accurately reflects my personal performance of the history, physical exam, assessment and plan. I have also personally directed, reviewed, and agree with the discharge instructions.

## 2023-05-04 NOTE — REASON FOR VISIT
[Follow-Up] : a follow-up visit [FreeTextEntry1] : allergies, asthma, GERD, low vitamin D, bronchiectasis, TBM, and post nasal drip

## 2023-05-04 NOTE — HISTORY OF PRESENT ILLNESS
[FreeTextEntry1] : Ms. Montoya is a 67 year old female presenting to the office today for a follow up visit for allergies, asthma, GERD, low vitamin D, and post nasal drip. Her chief complaint is cough and sore throat. \par \par -she notes seeing Dr. Iglesia Parson for her cough\par -she notes being told she has paradoxic vocal cord dysfunction, chronic rhinitis, GERD, laryngeal edema, and vagal neuropathy\par -s/p COVID-19 infection  12/2022 and has been having a residual cough since\par -she notes Trelegy helped her but gave her thrush\par -she notes reflux\par -she notes that she is always coughing\par -she notes a sour taste in her mouth\par \par -patient denies any nausea, vomiting, fever, chills, sweats, myalgias, dizziness, leg swelling, leg pain, itchy eyes, itchy ears

## 2023-05-04 NOTE — PROCEDURE
[FreeTextEntry1] : PFT's were performed for the evaluation of Asthma/SOB\par \par Full PFT- spi reveals normal flows; FEV1 was 2.54L which is 104% of predicted; normal lung volumes; normal diffusion at 13.8, which is 77% of predicted; normal flow volume loop \par \par Feno was 27; a normal value being less than 25. Fractional exhaled nitric oxide (FENO) is regarded as a simple, noninvasive method for assessing eosinophilic airway inflammation. Produced by a variety of cells within the lung, nitric oxide (NO) concentrations are generally low in healthy individuals. However, high concentrations of NO appear to be involved in nonspecific host defense mechanisms and chronic inflammatory  diseases such as asthma. The American Thoracic Society (ATS) therefore recommended using FENO to aid in the diagnosis and monitoring of eosinophilic airway inflammation and asthma, and for identifying steroid responsive individuals whose chronic respiratory symptoms may be caused by airway inflammation \par \par CT Chest Dynamic (2/8/2023) revealed findings compatible with tracheobronchomalacia. Few small lung nodules, largest a 5 mm left upper lobe groundglass nodule. Recommend follow-up in one year.

## 2023-05-04 NOTE — ASSESSMENT
[FreeTextEntry1] : Ms. Montoya is a 68 y/o female with a history of Crohn's disease, GERD, asthma, LPR, bronchiectasis/TBM - chronic multifactorial cough, ABIMAEL\par \par Her chronic cough is felt to be multifactorial:\par -GERD/LPR\par -TBM\par -bronchiectasis\par \par Problem 1: TBM/bronchiectasis\par -Add Neurontin 100 Q8H (observation)\par -Add Aerobika - mucus clearance device \par -Tracheomalacia is usually acquired in adults and common causes include damage by tracheostomy or endotracheal intubation damaging the tracheal cartilage with increase risk with multiple intubations, prolonged intubation, and concurrent high dose steroid therapy; external chest wall trauma and surgery; chronic compression of the trachea by benign etiologies (eg, benign mediastinal goiter) or malignancy; relapsing polychondritis; or recurrent infection. Tracheomalacia can be asymptomatic, however signs or symptoms can develop as the severity of the airway narrowing progresses with major symptoms include dyspnea, cough, and sputum retention. Other symptoms include severe paroxysms of coughing, wheezing or stridor, barking cough and may be exacerbated by forced expiration, cough, and valsalva maneuver. Tracheomalacia is diagnosed by a bronchoscopic visualization of dynamic airway collapse on dynamic chest CT. Therapy is warranted in symptomatic patients with severe tracheomalacia and includes surgical repair as tracheobronchoplasty. The patient was referred to Dr. Pranav Person or Dr. Vineet Ross, at [facility] for a surgical consult. \par -Seen on the CT of the chest or chest x-ray signifies damaged bronchial tubes focal or diffuse which can be sites of recurrent infections. These areas can be colonized by various organisms including bacteria (hemophilous influenza/Pseudomonas species etc.) as well as acid fast bacilli (myobacterial disease- inclusive of TB/PENG etc.). Sputum either for bacteria culture/sensitivity or AFB culture and sensitivity will need to be sent if the patient has sputum- 3 specimens on consecutive days will need to be dropped at the laboratory- if the patient can produce sputum. \par .\par \par problem 2: asthma\par -add Bevespi 2 puffs BID \par -add Alvesco 160 2 puffs BID \par -continue Ventolin rescue inhaler\par -Inhaler technique reviewed as well as oral hygiene techniques reviewed with patient. Avoidance of cold air, extremes of temperature, rescue inhaler should be used before exercise. Order of medication reviewed with patient. Recommended use of a cool mist humidifier in the bedroom. \par -Asthma is believed to be caused by inherited (genetic) and environmental factor, but its exact cause is unknown. Asthma may be triggered by allergens, lung infections, or irritants in the air. Asthma triggers are different for each person.\par \par problem 3: allergies\par -add Dymista 1 sniff BID \par -recommend Xlear saline\par Environmental measures for allergies were encouraged including mattress and pillow cover, air purifier, and environmental controls.\par \par problem 4: poor breathing mechanics\par -Proper breathing techniques were reviewed with an emphasis of exhalation. Patient instructed to breath in for 1 second and out for four seconds. Patient was encouraged to not talk while walking.\par \par problem 4: chest/back pain (resolved)\par -most likely muscular as it is reproducible\par -instructed to use Topricin cream on the area \par -stretches demonstrated for patient and instructed to perform daily\par \par problem 5: low vitamin D\par -continue to take vitamin D supplements \par -Low vitamin D Has been associated with asthma exacerbations and increased allergic symptoms. The goal based on recent information is maintaining levels between 50-70 and low normal is 30. Recommended 50,000 units every two weeks to once a month depending on the level. \par \par problem 6: GERD/ LPR\par -continue Desipramine 20mg qHS\par -add Pepcid 40 mg QHS \par -continue Dexilant 60mg QD\par -Things to avoid including overeating, spicy foods, tight clothing, eating within three hours of bed, this list is not all inclusive. \par -For treatment of reflux, possible options discussed including diet control, H2 blockers, PPIs, as well as coating motility agents discussed as treatment options. Timing of meals and proximity of last meal to sleep were discussed. If symptoms persist, a formal gastrointestinal evaluation is needed.\par -Rule of 2's: Avoid eating too late, too fast, too much, too spicy or within two hours of bedtime\par \par problem 7: r/o low immunity\par -recommended ImmunoCore supplement  \par -Due to the fact that this pt has had more infections than would be expected and immunological blood work is indicated this would include: IgG subclasses, quantitative immunoglobulins, Strep pneumoniae titers as well as Vitamin D levels.\par -Based on this blood work we will be able to decide where the pt needs additional pneumococcal vaccine either Prevnar 13 or pneumovax. Immunology evaluation will also be potentially indicated.  \par \par Problem 8: ?ABIMAEL (risk factors: GERD, elevated Mallampati class)\par -complete home sleep study\par -Sleep apnea is associated with adverse clinical consequences which can affect most organ systems. Cardiovascular disease risk includes arrhythmias, atrial fibrillation, hypertension, coronary artery disease, and stroke. Metabolic disorders include diabetes type 2, non-alcoholic fatty liver disease. Mood disorder especially depression; and cognitive decline especially in the elderly. Associations with chronic reflux/Betnon’s esophagus some but not all inclusive. \par -Reasons include arousal consistent with hypopnea; respiratory events most prominent in REM sleep or supine position; therefore sleep staging and body position are important for accurate diagnosis and estimation of AHI. \par \par problem 9: health maintenance\par -ENT: recommended speech therapy \par -recommended a yearly flu shot after October 15\par -recommended strep pneumonia vaccines: Prevnar-13 vaccine, followed by Pneumo vaccine 23 on year following\par -recommended early intervention for URIs\par -recommended osteoporosis evaluations\par -recommended early dermatological evaluations\par -recommended after the age of 50 to the age of 70, colonoscopy every 5 years\par -encouraged early intervention\par \par \par Follow up in 3 months\par The patient was encouraged to call with any changes, concerns, or questions.

## 2023-05-05 LAB — 24R-OH-CALCIDIOL SERPL-MCNC: 53.1 PG/ML

## 2023-05-06 LAB
A ALTERNATA IGE QN: 0.69 KUA/L
A ALTERNATA IGE QN: 0.69 KUA/L
A FUMIGATUS IGE QN: 0.23 KUA/L
A FUMIGATUS IGE QN: 0.23 KUA/L
BERMUDA GRASS IGE QN: <0.1 KUA/L
BOXELDER IGE QN: <0.1 KUA/L
C ALBICANS IGE QN: <0.1 KUA/L
C HERBARUM IGE QN: <0.1 KUA/L
C HERBARUM IGE QN: <0.1 KUA/L
CALIF WALNUT IGE QN: <0.1 KUA/L
CAT DANDER IGE QN: 2.39 KUA/L
CAT DANDER IGE QN: 2.39 KUA/L
CLAM IGE QN: <0.1 KUA/L
CMN PIGWEED IGE QN: <0.1 KUA/L
CODFISH IGE QN: <0.1 KUA/L
COMMON RAGWEED IGE QN: <0.1 KUA/L
COMMON RAGWEED IGE QN: <0.1 KUA/L
CORN IGE QN: <0.1 KUA/L
COTTONWOOD IGE QN: <0.1 KUA/L
COW MILK IGE QN: <0.1 KUA/L
D FARINAE IGE QN: <0.1 KUA/L
D FARINAE IGE QN: <0.1 KUA/L
D PTERONYSS IGE QN: <0.1 KUA/L
D PTERONYSS IGE QN: <0.1 KUA/L
DEPRECATED A ALTERNATA IGE RAST QL: 1
DEPRECATED A ALTERNATA IGE RAST QL: 1
DEPRECATED A FUMIGATUS IGE RAST QL: NORMAL
DEPRECATED A FUMIGATUS IGE RAST QL: NORMAL
DEPRECATED BERMUDA GRASS IGE RAST QL: 0
DEPRECATED BOXELDER IGE RAST QL: 0
DEPRECATED C ALBICANS IGE RAST QL: 0
DEPRECATED C HERBARUM IGE RAST QL: 0
DEPRECATED C HERBARUM IGE RAST QL: 0
DEPRECATED CAT DANDER IGE RAST QL: 2
DEPRECATED CAT DANDER IGE RAST QL: 2
DEPRECATED CLAM IGE RAST QL: 0
DEPRECATED CODFISH IGE RAST QL: 0
DEPRECATED COMMON PIGWEED IGE RAST QL: 0
DEPRECATED COMMON RAGWEED IGE RAST QL: 0
DEPRECATED COMMON RAGWEED IGE RAST QL: 0
DEPRECATED CORN IGE RAST QL: 0
DEPRECATED COTTONWOOD IGE RAST QL: 0
DEPRECATED COW MILK IGE RAST QL: 0
DEPRECATED D FARINAE IGE RAST QL: 0
DEPRECATED D FARINAE IGE RAST QL: 0
DEPRECATED D PTERONYSS IGE RAST QL: 0
DEPRECATED D PTERONYSS IGE RAST QL: 0
DEPRECATED DOG DANDER IGE RAST QL: 1
DEPRECATED DOG DANDER IGE RAST QL: 1
DEPRECATED DUCK FEATHER IGE RAST QL: 0
DEPRECATED EGG WHITE IGE RAST QL: 0
DEPRECATED GOOSE FEATHER IGE RAST QL: 0
DEPRECATED GOOSEFOOT IGE RAST QL: 0
DEPRECATED LONDON PLANE IGE RAST QL: 2
DEPRECATED M RACEMOSUS IGE RAST QL: 0
DEPRECATED MOUSE URINE PROT IGE RAST QL: 0
DEPRECATED MUGWORT IGE RAST QL: 1
DEPRECATED P NOTATUM IGE RAST QL: 0
DEPRECATED PEANUT IGE RAST QL: 0
DEPRECATED RED CEDAR IGE RAST QL: 0
DEPRECATED ROACH IGE RAST QL: 0
DEPRECATED ROACH IGE RAST QL: 0
DEPRECATED SCALLOP IGE RAST QL: <0.1 KUA/L
DEPRECATED SESAME SEED IGE RAST QL: 0
DEPRECATED SHEEP SORREL IGE RAST QL: 0
DEPRECATED SHRIMP IGE RAST QL: 0
DEPRECATED SILVER BIRCH IGE RAST QL: 2
DEPRECATED SOYBEAN IGE RAST QL: 0
DEPRECATED TIMOTHY IGE RAST QL: NORMAL
DEPRECATED TIMOTHY IGE RAST QL: NORMAL
DEPRECATED WALNUT IGE RAST QL: 0
DEPRECATED WHEAT IGE RAST QL: 0
DEPRECATED WHITE ASH IGE RAST QL: 0
DEPRECATED WHITE OAK IGE RAST QL: 2
DEPRECATED WHITE OAK IGE RAST QL: 2
DOG DANDER IGE QN: 0.52 KUA/L
DOG DANDER IGE QN: 0.52 KUA/L
DUCK FEATHER IGE QN: <0.1 KUA/L
EGG WHITE IGE QN: <0.1 KUA/L
GOOSE FEATHER IGE QN: <0.1 KUA/L
GOOSEFOOT IGE QN: <0.1 KUA/L
LONDON PLANE IGE QN: 1.03 KUA/L
M RACEMOSUS IGE QN: <0.1 KUA/L
MOUSE URINE PROT IGE QN: <0.1 KUA/L
MUGWORT IGE QN: 0.41 KUA/L
MULBERRY (T70) CLASS: 0
MULBERRY (T70) CONC: <0.1 KUA/L
P NOTATUM IGE QN: <0.1 KUA/L
PEANUT IGE QN: <0.1 KUA/L
RED CEDAR IGE QN: <0.1 KUA/L
ROACH IGE QN: <0.1 KUA/L
ROACH IGE QN: <0.1 KUA/L
SCALLOP IGE QN: 0
SCALLOP IGE QN: <0.1 KUA/L
SESAME SEED IGE QN: <0.1 KUA/L
SHEEP SORREL IGE QN: <0.1 KUA/L
SILVER BIRCH IGE QN: 1.79 KUA/L
SOYBEAN IGE QN: <0.1 KUA/L
TIMOTHY IGE QN: 0.13 KUA/L
TIMOTHY IGE QN: 0.13 KUA/L
TOTAL IGE SMQN RAST: 23 KU/L
TREE ALLERG MIX1 IGE QL: 0
WALNUT IGE QN: <0.1 KUA/L
WHEAT IGE QN: <0.1 KUA/L
WHITE ASH IGE QN: <0.1 KUA/L
WHITE ELM IGE QN: 0
WHITE ELM IGE QN: <0.1 KUA/L
WHITE OAK IGE QN: 2.15 KUA/L
WHITE OAK IGE QN: 2.15 KUA/L

## 2023-05-09 ENCOUNTER — NON-APPOINTMENT (OUTPATIENT)
Age: 68
End: 2023-05-09

## 2023-07-20 ENCOUNTER — APPOINTMENT (OUTPATIENT)
Dept: PULMONOLOGY | Facility: CLINIC | Age: 68
End: 2023-07-20
Payer: MEDICARE

## 2023-07-20 ENCOUNTER — NON-APPOINTMENT (OUTPATIENT)
Age: 68
End: 2023-07-20

## 2023-07-20 VITALS
OXYGEN SATURATION: 98 % | DIASTOLIC BLOOD PRESSURE: 72 MMHG | HEIGHT: 65 IN | BODY MASS INDEX: 21.33 KG/M2 | RESPIRATION RATE: 16 BRPM | SYSTOLIC BLOOD PRESSURE: 110 MMHG | WEIGHT: 128 LBS | TEMPERATURE: 97.3 F | HEART RATE: 68 BPM

## 2023-07-20 PROCEDURE — 94010 BREATHING CAPACITY TEST: CPT

## 2023-07-20 PROCEDURE — 95012 NITRIC OXIDE EXP GAS DETER: CPT

## 2023-07-20 PROCEDURE — 99214 OFFICE O/P EST MOD 30 MIN: CPT | Mod: 25

## 2023-07-20 NOTE — ADDENDUM
[FreeTextEntry1] : Documented by Moshe Jackson acting as a scribe for Dr. Hal Gardiner on 07/20/2023 .\par \par All medical record entries made by the Scribe were at my, Dr. Hal Gardiner's, direction and personally dictated by me on 07/20/2023 . I have reviewed the chart and agree that the record accurately reflects my personal performance of the history, physical exam, assessment and plan. I have also personally directed, reviewed, and agree with the discharge instructions.

## 2023-07-20 NOTE — ASSESSMENT
[FreeTextEntry1] : Ms. Montoya is a 68 y/o female with a history of Crohn's disease, GERD, asthma, LPR, bronchiectasis/TBM - chronic multifactorial cough, ABIMAEL- improved except reflux \par \par Her chronic cough is felt to be multifactorial:\par -GERD/LPR\par -TBM\par -bronchiectasis\par \par Problem 1: TBM/bronchiectasis\par -Add Neurontin 100 Q8H (observation)\par -Add Aerobika - mucus clearance device \par repeat dynamic CT\par -Tracheomalacia is usually acquired in adults and common causes include damage by tracheostomy or endotracheal intubation damaging the tracheal cartilage with increase risk with multiple intubations, prolonged intubation, and concurrent high dose steroid therapy; external chest wall trauma and surgery; chronic compression of the trachea by benign etiologies (eg, benign mediastinal goiter) or malignancy; relapsing polychondritis; or recurrent infection. Tracheomalacia can be asymptomatic, however signs or symptoms can develop as the severity of the airway narrowing progresses with major symptoms include dyspnea, cough, and sputum retention. Other symptoms include severe paroxysms of coughing, wheezing or stridor, barking cough and may be exacerbated by forced expiration, cough, and valsalva maneuver. Tracheomalacia is diagnosed by a bronchoscopic visualization of dynamic airway collapse on dynamic chest CT. Therapy is warranted in symptomatic patients with severe tracheomalacia and includes surgical repair as tracheobronchoplasty. The patient was referred to Dr. Pranav Person or Dr. Vineet Ross, at [facility] for a surgical consult. \par -Seen on the CT of the chest or chest x-ray signifies damaged bronchial tubes focal or diffuse which can be sites of recurrent infections. These areas can be colonized by various organisms including bacteria (hemophilous influenza/Pseudomonas species etc.) as well as acid fast bacilli (myobacterial disease- inclusive of TB/PENG etc.). Sputum either for bacteria culture/sensitivity or AFB culture and sensitivity will need to be sent if the patient has sputum- 3 specimens on consecutive days will need to be dropped at the laboratory- if the patient can produce sputum. \par .\par \par problem 2: asthma- improved \par -add Bevespi 2 puffs BID \par -add Alvesco 160 2 puffs BID \par -continue Ventolin rescue inhaler\par -Inhaler technique reviewed as well as oral hygiene techniques reviewed with patient. Avoidance of cold air, extremes of temperature, rescue inhaler should be used before exercise. Order of medication reviewed with patient. Recommended use of a cool mist humidifier in the bedroom. \par -Asthma is believed to be caused by inherited (genetic) and environmental factor, but its exact cause is unknown. Asthma may be triggered by allergens, lung infections, or irritants in the air. Asthma triggers are different for each person.\par \par problem 3: allergies\par -add Dymista 1 sniff BID \par -recommend Xlear saline\par Environmental measures for allergies were encouraged including mattress and pillow cover, air purifier, and environmental controls.\par \par problem 4: poor breathing mechanics\par -Proper breathing techniques were reviewed with an emphasis of exhalation. Patient instructed to breath in for 1 second and out for four seconds. Patient was encouraged to not talk while walking.\par \par problem 4: chest/back pain (resolved)\par -most likely muscular as it is reproducible\par -instructed to use Topricin cream on the area \par -stretches demonstrated for patient and instructed to perform daily\par \par problem 5: low vitamin D\par -continue to take vitamin D supplements \par -Low vitamin D Has been associated with asthma exacerbations and increased allergic symptoms. The goal based on recent information is maintaining levels between 50-70 and low normal is 30. Recommended 50,000 units every two weeks to once a month depending on the level. \par \par problem 6: GERD/ LPR\par -continue Desipramine 20mg qHS\par -add Pepcid 40 mg QHS \par -continue Dexilant 60mg QD\par - reflux gourmet (Tico Seagul)\par -Things to avoid including overeating, spicy foods, tight clothing, eating within three hours of bed, this list is not all inclusive. \par -For treatment of reflux, possible options discussed including diet control, H2 blockers, PPIs, as well as coating motility agents discussed as treatment options. Timing of meals and proximity of last meal to sleep were discussed. If symptoms persist, a formal gastrointestinal evaluation is needed.\par -Rule of 2's: Avoid eating too late, too fast, too much, too spicy or within two hours of bedtime\par \par problem 7: r/o low immunity\par -recommended ImmunoCore supplement  \par -Due to the fact that this pt has had more infections than would be expected and immunological blood work is indicated this would include: IgG subclasses, quantitative immunoglobulins, Strep pneumoniae titers as well as Vitamin D levels.\par -Based on this blood work we will be able to decide where the pt needs additional pneumococcal vaccine either Prevnar 13 or pneumovax. Immunology evaluation will also be potentially indicated.  \par \par Problem 8: ?ABIMAEL (risk factors: GERD, elevated Mallampati class)\par -complete home sleep study\par -Sleep apnea is associated with adverse clinical consequences which can affect most organ systems. Cardiovascular disease risk includes arrhythmias, atrial fibrillation, hypertension, coronary artery disease, and stroke. Metabolic disorders include diabetes type 2, non-alcoholic fatty liver disease. Mood disorder especially depression; and cognitive decline especially in the elderly. Associations with chronic reflux/Benton’s esophagus some but not all inclusive. \par -Reasons include arousal consistent with hypopnea; respiratory events most prominent in REM sleep or supine position; therefore sleep staging and body position are important for accurate diagnosis and estimation of AHI. \par \par problem 9: health maintenance\par -ENT: recommended speech therapy \par -recommended a yearly flu shot after October 15\par -recommended strep pneumonia vaccines: Prevnar-13 vaccine, followed by Pneumo vaccine 23 on year following\par -recommended early intervention for URIs\par -recommended osteoporosis evaluations\par -recommended early dermatological evaluations\par -recommended after the age of 50 to the age of 70, colonoscopy every 5 years\par -encouraged early intervention\par \par \par Follow up in 3 months\par The patient was encouraged to call with any changes, concerns, or questions.

## 2023-07-20 NOTE — PROCEDURE
[FreeTextEntry1] : PFT reveals normal flows, with an FEV1 of  2.21 L, which is  92% of predicted, with normal flow volume loop; PFT's were performed for the evaluation of asthma\par \par FENO was 14; a normal value being less than 25\par Fractional exhaled nitric oxide (FENO) is regarded as a simple, noninvasive method for assessing eosinophilic airway inflammation. Produced by a variety of cells within the lung, nitric oxide (NO) concentrations are generally low in healthy individuals. However, high concentrations of NO appear to be involved in nonspecific host defense mechanisms and chronic inflammatory diseases such as asthma. The American Thoracic Society (ATS) therefore has recommended using FENO to aid in the diagnosis and monitoring of eosinophilic airway inflammation and asthma, and for identifying steroid responsive individuals whose chronic respiratory symptoms may be caused by airway inflammation. \par

## 2023-07-20 NOTE — HISTORY OF PRESENT ILLNESS
[FreeTextEntry1] : Ms. Montoya is a 67 year old female presenting to the office today for a follow up visit for allergies, asthma, GERD, low vitamin D, and post nasal drip. Her chief complaint is cough and sore throat. \par \par -she notes feeling generally well\par - she notes cough is no longer productive\par - she notes still on cingular and breztri \par - she notes being hoarse  \par -she notes NC with reflux gourmet for GERD \par - she notes GERD due to diet\par - she notes her vision is stable\par \par \par -she denies any headaches, nausea, emesis, fever, chills, sweats, chest pain, chest pressure, wheezing, palpitations, constipation, diarrhea, vertigo, dysphagia, itchy eyes, itchy ears, leg swelling, leg pain, arthralgias, myalgias, or sour taste in the mouth.

## 2023-08-01 ENCOUNTER — APPOINTMENT (OUTPATIENT)
Dept: CT IMAGING | Facility: IMAGING CENTER | Age: 68
End: 2023-08-01
Payer: MEDICARE

## 2023-08-01 ENCOUNTER — OUTPATIENT (OUTPATIENT)
Dept: OUTPATIENT SERVICES | Facility: HOSPITAL | Age: 68
LOS: 1 days | End: 2023-08-01
Payer: MEDICARE

## 2023-08-01 DIAGNOSIS — J39.8 OTHER SPECIFIED DISEASES OF UPPER RESPIRATORY TRACT: ICD-10-CM

## 2023-08-01 PROCEDURE — 71250 CT THORAX DX C-: CPT

## 2023-08-01 PROCEDURE — 71250 CT THORAX DX C-: CPT | Mod: 26,MH

## 2023-08-02 ENCOUNTER — TRANSCRIPTION ENCOUNTER (OUTPATIENT)
Age: 68
End: 2023-08-02

## 2023-08-03 ENCOUNTER — RX RENEWAL (OUTPATIENT)
Age: 68
End: 2023-08-03

## 2023-08-06 ENCOUNTER — RX RENEWAL (OUTPATIENT)
Age: 68
End: 2023-08-06

## 2023-08-06 RX ORDER — GLYCOPYRROLATE AND FORMOTEROL FUMARATE 9; 4.8 UG/1; UG/1
9-4.8 AEROSOL, METERED RESPIRATORY (INHALATION)
Qty: 3 | Refills: 3 | Status: ACTIVE | COMMUNITY
Start: 2023-05-04 | End: 1900-01-01

## 2023-08-17 ENCOUNTER — APPOINTMENT (OUTPATIENT)
Dept: SURGERY | Facility: CLINIC | Age: 68
End: 2023-08-17
Payer: MEDICARE

## 2023-08-17 VITALS
HEIGHT: 65 IN | DIASTOLIC BLOOD PRESSURE: 81 MMHG | RESPIRATION RATE: 18 BRPM | TEMPERATURE: 96.6 F | OXYGEN SATURATION: 98 % | SYSTOLIC BLOOD PRESSURE: 138 MMHG | WEIGHT: 128 LBS | BODY MASS INDEX: 21.33 KG/M2 | HEART RATE: 80 BPM

## 2023-08-17 DIAGNOSIS — Z83.79 FAMILY HISTORY OF OTHER DISEASES OF THE DIGESTIVE SYSTEM: ICD-10-CM

## 2023-08-17 PROCEDURE — 99203 OFFICE O/P NEW LOW 30 MIN: CPT | Mod: 25

## 2023-08-17 PROCEDURE — 46600 DIAGNOSTIC ANOSCOPY SPX: CPT

## 2023-08-17 NOTE — HISTORY OF PRESENT ILLNESS
[FreeTextEntry1] : Samantha is a 68 y/o female here for a consultation visit, anal stenosis  Colonoscopy 07/14/23 by Dr. Win (Crohn's, Unspecified Site) - Erosions and exudate in the rectum. (Biopsy) Polyp in the ascending colon. (Polypectomy)  Today pt reports feeling occasional anal discomfort, feels cut or tear, itchiness, and swelling tissues (has been there for a while) since June of this year after BMs is more bothersome, started sitz bath with little relief.   Formed/soft sometimes hard BMs once daily, occasional straining, light pink bleeding when wiping, last episode of rectal bleeding a couple of weeks ago, not taking any stool softener.  Denies prolapsing tissue.  No episodes of incontinence of stool or flatus.  Very good appetite.  No c/o nausea/vomiting.  Denies fever and chills.  Not on anticoagulants.  Pt has h/o Crohn's disease Dx at age of 18, on Remicade infusion every 8 weeks.  Abdominal surgery history, C-sections x2.

## 2023-08-17 NOTE — ASSESSMENT
[FreeTextEntry1] : I have seen and evaluated patient and I have corroborated all nursing input into this note.  Patient with Crohn's disease.  She has mild anal stenosis and severe anal sphincter spasm.  She has associated superficial fissures and skin tags.  I prescribed diltiazem cream.  The patient has mild discomfort.  I informed her that if her pain becomes more significant and problematic then Botox injections can be performed.

## 2023-08-17 NOTE — PHYSICAL EXAM
[Normal Breath Sounds] : Normal breath sounds [Normal Heart Sounds] : normal heart sounds [No Rash or Lesion] : No rash or lesion [Alert] : alert [Oriented to Person] : oriented to person [Oriented to Place] : oriented to place [Oriented to Time] : oriented to time [Calm] : calm [JVD] : no jugular venous distention  [de-identified] : Well nurished female [de-identified] : Normal [de-identified] : Normal [de-identified] : Normal [FreeTextEntry1] : Perianal inspection demonstrated large tags.  Mild stenosis noted on digital exam.  However, there was severe sphincter spasm.  Anoscopy revealed several superficial fissures.  Anoscopy performed to evaluate anal canal.  No sedation required.

## 2023-08-17 NOTE — CONSULT LETTER
[Dear  ___] : Dear ~HAFSA, [Courtesy Letter:] : I had the pleasure of seeing your patient, [unfilled], in my office today. [Please see my note below.] : Please see my note below. [Consult Closing:] : Thank you very much for allowing me to participate in the care of this patient.  If you have any questions, please do not hesitate to contact me. [Sincerely,] : Sincerely, [DrYolanda  ___] : Dr. FENTON [FreeTextEntry2] : Dr. Sedrick Win [FreeTextEntry3] : Ayo He M.D., F.REJI.C.S., F.A.S.C.R.S. Chief Colorectal Clinical Services, [facility]

## 2023-11-07 ENCOUNTER — APPOINTMENT (OUTPATIENT)
Dept: PULMONOLOGY | Facility: CLINIC | Age: 68
End: 2023-11-07
Payer: MEDICARE

## 2023-11-07 VITALS
BODY MASS INDEX: 21.33 KG/M2 | HEIGHT: 65 IN | SYSTOLIC BLOOD PRESSURE: 116 MMHG | DIASTOLIC BLOOD PRESSURE: 70 MMHG | TEMPERATURE: 94.9 F | OXYGEN SATURATION: 98 % | WEIGHT: 128 LBS | HEART RATE: 90 BPM | RESPIRATION RATE: 16 BRPM

## 2023-11-07 PROCEDURE — 99214 OFFICE O/P EST MOD 30 MIN: CPT

## 2023-11-07 RX ORDER — ALBUTEROL SULFATE 90 UG/1
108 (90 BASE) INHALANT RESPIRATORY (INHALATION)
Qty: 1 | Refills: 1 | Status: ACTIVE | COMMUNITY
Start: 2022-12-14 | End: 1900-01-01

## 2023-11-08 ENCOUNTER — NON-APPOINTMENT (OUTPATIENT)
Age: 68
End: 2023-11-08

## 2023-11-08 ENCOUNTER — APPOINTMENT (OUTPATIENT)
Dept: OPHTHALMOLOGY | Facility: CLINIC | Age: 68
End: 2023-11-08
Payer: MEDICARE

## 2023-11-08 PROCEDURE — 92012 INTRM OPH EXAM EST PATIENT: CPT

## 2023-11-17 ENCOUNTER — NON-APPOINTMENT (OUTPATIENT)
Age: 68
End: 2023-11-17

## 2023-11-17 ENCOUNTER — APPOINTMENT (OUTPATIENT)
Dept: OPHTHALMOLOGY | Facility: CLINIC | Age: 68
End: 2023-11-17
Payer: MEDICARE

## 2023-11-17 PROCEDURE — 99213 OFFICE O/P EST LOW 20 MIN: CPT | Mod: 25

## 2023-11-17 PROCEDURE — 11900 INJECT SKIN LESIONS </W 7: CPT | Mod: E2

## 2023-11-24 ENCOUNTER — APPOINTMENT (OUTPATIENT)
Dept: PULMONOLOGY | Facility: CLINIC | Age: 68
End: 2023-11-24
Payer: MEDICARE

## 2023-11-24 VITALS
DIASTOLIC BLOOD PRESSURE: 72 MMHG | SYSTOLIC BLOOD PRESSURE: 130 MMHG | OXYGEN SATURATION: 98 % | WEIGHT: 128 LBS | HEART RATE: 83 BPM | RESPIRATION RATE: 16 BRPM | BODY MASS INDEX: 21.33 KG/M2 | HEIGHT: 65 IN | TEMPERATURE: 97.2 F

## 2023-11-24 DIAGNOSIS — G43.109 MIGRAINE WITH AURA, NOT INTRACTABLE, W/OUT STATUS MIGRAINOSUS: ICD-10-CM

## 2023-11-24 PROCEDURE — 99214 OFFICE O/P EST MOD 30 MIN: CPT | Mod: 25

## 2023-11-24 PROCEDURE — 94010 BREATHING CAPACITY TEST: CPT

## 2023-11-24 PROCEDURE — 95012 NITRIC OXIDE EXP GAS DETER: CPT

## 2023-11-24 RX ORDER — BUDESONIDE 0.5 MG/2ML
0.5 INHALANT ORAL TWICE DAILY
Qty: 60 | Refills: 3 | Status: ACTIVE | COMMUNITY
Start: 2023-11-24 | End: 1900-01-01

## 2023-12-08 ENCOUNTER — NON-APPOINTMENT (OUTPATIENT)
Age: 68
End: 2023-12-08

## 2023-12-08 ENCOUNTER — APPOINTMENT (OUTPATIENT)
Dept: OPHTHALMOLOGY | Facility: CLINIC | Age: 68
End: 2023-12-08
Payer: MEDICARE

## 2023-12-08 PROCEDURE — 11900 INJECT SKIN LESIONS </W 7: CPT | Mod: E2

## 2023-12-08 PROCEDURE — 92285 EXTERNAL OCULAR PHOTOGRAPHY: CPT

## 2023-12-20 ENCOUNTER — APPOINTMENT (OUTPATIENT)
Dept: OPHTHALMOLOGY | Facility: CLINIC | Age: 68
End: 2023-12-20

## 2023-12-29 ENCOUNTER — APPOINTMENT (OUTPATIENT)
Dept: OPHTHALMOLOGY | Facility: CLINIC | Age: 68
End: 2023-12-29

## 2024-01-14 ENCOUNTER — RX RENEWAL (OUTPATIENT)
Age: 69
End: 2024-01-14

## 2024-01-14 RX ORDER — ALBUTEROL SULFATE 90 UG/1
108 (90 BASE) AEROSOL, METERED RESPIRATORY (INHALATION) EVERY 6 HOURS
Qty: 3 | Refills: 1 | Status: ACTIVE | COMMUNITY
Start: 2017-09-28 | End: 1900-01-01

## 2024-02-08 ENCOUNTER — APPOINTMENT (OUTPATIENT)
Dept: SURGERY | Facility: CLINIC | Age: 69
End: 2024-02-08

## 2024-02-22 ENCOUNTER — APPOINTMENT (OUTPATIENT)
Dept: THORACIC SURGERY | Facility: CLINIC | Age: 69
End: 2024-02-22

## 2024-03-25 ENCOUNTER — APPOINTMENT (OUTPATIENT)
Dept: ULTRASOUND IMAGING | Facility: CLINIC | Age: 69
End: 2024-03-25
Payer: MEDICARE

## 2024-03-25 ENCOUNTER — APPOINTMENT (OUTPATIENT)
Dept: MAMMOGRAPHY | Facility: CLINIC | Age: 69
End: 2024-03-25
Payer: MEDICARE

## 2024-03-25 PROCEDURE — 77063 BREAST TOMOSYNTHESIS BI: CPT

## 2024-03-25 PROCEDURE — 77067 SCR MAMMO BI INCL CAD: CPT

## 2024-03-25 PROCEDURE — 76641 ULTRASOUND BREAST COMPLETE: CPT | Mod: 50,GY

## 2024-04-04 ENCOUNTER — APPOINTMENT (OUTPATIENT)
Dept: PULMONOLOGY | Facility: CLINIC | Age: 69
End: 2024-04-04
Payer: MEDICARE

## 2024-04-04 VITALS
HEART RATE: 98 BPM | HEIGHT: 65 IN | RESPIRATION RATE: 16 BRPM | SYSTOLIC BLOOD PRESSURE: 122 MMHG | OXYGEN SATURATION: 98 % | BODY MASS INDEX: 22.29 KG/M2 | TEMPERATURE: 97.1 F | WEIGHT: 133.8 LBS | DIASTOLIC BLOOD PRESSURE: 76 MMHG

## 2024-04-04 DIAGNOSIS — R79.89 OTHER SPECIFIED ABNORMAL FINDINGS OF BLOOD CHEMISTRY: ICD-10-CM

## 2024-04-04 DIAGNOSIS — D84.9 IMMUNODEFICIENCY, UNSPECIFIED: ICD-10-CM

## 2024-04-04 DIAGNOSIS — R06.02 SHORTNESS OF BREATH: ICD-10-CM

## 2024-04-04 DIAGNOSIS — J30.9 ALLERGIC RHINITIS, UNSPECIFIED: ICD-10-CM

## 2024-04-04 PROCEDURE — 94727 GAS DIL/WSHOT DETER LNG VOL: CPT

## 2024-04-04 PROCEDURE — 94010 BREATHING CAPACITY TEST: CPT

## 2024-04-04 PROCEDURE — 95012 NITRIC OXIDE EXP GAS DETER: CPT

## 2024-04-04 PROCEDURE — 99214 OFFICE O/P EST MOD 30 MIN: CPT | Mod: 25

## 2024-04-04 PROCEDURE — 94729 DIFFUSING CAPACITY: CPT

## 2024-04-04 RX ORDER — BECLOMETHASONE DIPROPIONATE HFA 40 UG/1
40 AEROSOL, METERED RESPIRATORY (INHALATION)
Qty: 10.6 | Refills: 5 | Status: ACTIVE | COMMUNITY
Start: 2024-04-04 | End: 1900-01-01

## 2024-04-04 NOTE — ASSESSMENT
[FreeTextEntry1] : Ms. Montoya is a 67 y/o female with a history of Crohn's disease, GERD, asthma, LPR, bronchiectasis/TBM - chronic multifactorial cough, ABIMAEL- active asthma; #1 issue is Crohn's disease ; semi active Asthma  Her chronic cough is felt to be multifactorial: -GERD/LPR -TBM -bronchiectasis  Problem 1: TBM/bronchiectasis -continue Neurontin 100 Q8H (observation) -continue Aerobika - mucus clearance device -s/p dynamic chest CT 8/2023 -Tracheomalacia is usually acquired in adults and common causes include damage by tracheostomy or endotracheal intubation damaging the tracheal cartilage with increase risk with multiple intubations, prolonged intubation, and concurrent high dose steroid therapy; external chest wall trauma and surgery; chronic compression of the trachea by benign etiologies (eg, benign mediastinal goiter) or malignancy; relapsing polychondritis; or recurrent infection. Tracheomalacia can be asymptomatic, however signs or symptoms can develop as the severity of the airway narrowing progresses with major symptoms include dyspnea, cough, and sputum retention. Other symptoms include severe paroxysms of coughing, wheezing or stridor, barking cough and may be exacerbated by forced expiration, cough, and valsalva maneuver. Tracheomalacia is diagnosed by a bronchoscopic visualization of dynamic airway collapse on dynamic chest CT. Therapy is warranted in symptomatic patients with severe tracheomalacia and includes surgical repair as tracheobronchoplasty. The patient was referred to Dr. Pranav Person or Dr. Vineet Ross, at [facility] for a surgical consult. -Seen on the CT of the chest or chest x-ray signifies damaged bronchial tubes focal or diffuse which can be sites of recurrent infections. These areas can be colonized by various organisms including bacteria (hemophilous influenza/Pseudomonas species etc.) as well as acid fast bacilli (myobacterial disease- inclusive of TB/PENG etc.). Sputum either for bacteria culture/sensitivity or AFB culture and sensitivity will need to be sent if the patient has sputum- 3 specimens on consecutive days will need to be dropped at the laboratory- if the patient can produce sputum.  problem 2: asthma- (NC w/ ICS)  -Add Qvar Redihaler 80 2 puffs BID -continue Bevespi 2 puffs BID -continue Alvesco 160 2 puffs BID -continue Ventolin rescue inhaler -add Xopenex 0.63 via nebulizer q6H prn  -add Pulmicort 0.5% via nebulizer BID PRN -Inhaler technique reviewed as well as oral hygiene techniques reviewed with patient. Avoidance of cold air, extremes of temperature, rescue inhaler should be used before exercise. Order of medication reviewed with patient. Recommended use of a cool mist humidifier in the bedroom. -Asthma is believed to be caused by inherited (genetic) and environmental factor, but its exact cause is unknown. Asthma may be triggered by allergens, lung infections, or irritants in the air. Asthma triggers are different for each person.  problem 3: allergies -continue Dymista 1 sniff BID -recommend Xlear saline Environmental measures for allergies were encouraged including mattress and pillow cover, air purifier, and environmental controls.  problem 4: poor breathing mechanics -Proper breathing techniques were reviewed with an emphasis of exhalation. Patient instructed to breath in for 1 second and out for four seconds. Patient was encouraged to not talk while walking.  problem 4: chest/back pain (resolved) -most likely muscular as it is reproducible -instructed to use Topricin cream on the area -stretches demonstrated for patient and instructed to perform daily  problem 5: low vitamin D -continue to take vitamin D supplements -Low vitamin D Has been associated with asthma exacerbations and increased allergic symptoms. The goal based on recent information is maintaining levels between 50-70 and low normal is 30. Recommended 50,000 units every two weeks to once a month depending on the level.  problem 6: GERD/ LPR -continue Desipramine 20mg qHS -continue Pepcid 40 mg QHS -continue Dexilant 60mg QD - reflux gourmet (Tico Foster) -Things to avoid including overeating, spicy foods, tight clothing, eating within three hours of bed, this list is not all inclusive. -For treatment of reflux, possible options discussed including diet control, H2 blockers, PPIs, as well as coating motility agents discussed as treatment options. Timing of meals and proximity of last meal to sleep were discussed. If symptoms persist, a formal gastrointestinal evaluation is needed. -Rule of 2's: Avoid eating too late, too fast, too much, too spicy or within two hours of bedtime  problem 7: r/o low immunity -recommended ImmunoCore supplement -Due to the fact that this pt has had more infections than would be expected and immunological blood work is indicated this would include: IgG subclasses, quantitative immunoglobulins, Strep pneumoniae titers as well as Vitamin D levels. -Based on this blood work we will be able to decide where the pt needs additional pneumococcal vaccine either Prevnar 13 or pneumovax. Immunology evaluation will also be potentially indicated.  Problem 8: ?ABIMAEL (risk factors: GERD, elevated Mallampati class) -complete home sleep study (NC) -Sleep apnea is associated with adverse clinical consequences which can affect most organ systems. Cardiovascular disease risk includes arrhythmias, atrial fibrillation, hypertension, coronary artery disease, and stroke. Metabolic disorders include diabetes type 2, non-alcoholic fatty liver disease. Mood disorder especially depression; and cognitive decline especially in the elderly. Associations with chronic reflux/Benton's esophagus some but not all inclusive. -Reasons include arousal consistent with hypopnea; respiratory events most prominent in REM sleep or supine position; therefore sleep staging and body position are important for accurate diagnosis and estimation of AHI.  problem 9: health maintenance -ENT: recommended speech therapy -recommended a yearly flu shot after October 15 2024 -recommended strep pneumonia vaccines: Prevnar-13 vaccine, followed by Pneumo vaccine 23 on year following-completed -recommended early intervention for URIs -recommended osteoporosis evaluations -recommended early dermatological evaluations -recommended after the age of 50 to the age of 70, colonoscopy every 5 years  Follow up in 3-4 months The patient was encouraged to call with any changes, concerns, or questions.

## 2024-04-04 NOTE — HISTORY OF PRESENT ILLNESS
[FreeTextEntry1] : Ms. Montoya is a 68 year old female presenting to the office today for a follow up pulmonary evaluation for allergies, asthma, GERD, low vitamin D, and post nasal drip. Her chief complaint is   -she notes insurance wont cover some medications -she notes being weened off predisone but notes she felt worse when being on it -she notes being on two nebulzers and bebvesbi and Alvesco -she notes having chronic back issues -she notes not being able to walk properly since a few days ago- Dr. Barbosa who told her it may be related to the inflammation from the Prednisone. she notes sleep is poor at 6 hours. -she notes she sleeps well until 2-3 AM and then sleep poorly until 6-7 AM -she notes taking Singulair    -Patient denies any headaches, nausea, vomiting, fever, chills, sweats, chest pain, chest pressure, palpitations, coughing, wheezing, fatigue, diarrhea, constipation, dysphagia, arthralgias, myalgias, dizziness, leg swelling, leg pain, itchy eyes, itchy ears, heartburn, reflux or sour taste in mouth.

## 2024-04-04 NOTE — PHYSICAL EXAM
[No Acute Distress] : no acute distress [Normal Oropharynx] : normal oropharynx [III] : Mallampati Class: III [Normal Appearance] : normal appearance [No Neck Mass] : no neck mass [Normal Rate/Rhythm] : normal rate/rhythm [Normal S1, S2] : normal s1, s2 [No Murmurs] : no murmurs [No Resp Distress] : no resp distress [Clear to Auscultation Bilaterally] : clear to auscultation bilaterally [Kyphosis] : kyphosis [Benign] : benign [Normal Gait] : normal gait [No Clubbing] : no clubbing [No Cyanosis] : no cyanosis [No Edema] : no edema [FROM] : FROM [Normal Color/ Pigmentation] : normal color/ pigmentation [No Focal Deficits] : no focal deficits [Oriented x3] : oriented x3 [Normal Affect] : normal affect [TextBox_68] : I:E 1:3; Clear

## 2024-04-04 NOTE — ADDENDUM
[FreeTextEntry1] :  Documented by Brodie Contreras acting as a scribe for Dr. Hal Gardiner on 04/04/2024 .   All medical record entries made by the Scribe were at my, Dr. Hal Gardiner's direction and personally dictated by me on 04/04/2024 . I have reviewed the chart and agree that the record accurately reflects my personal performance of the history, Physical exam, assessment, and plan. I have also personally directed, reviewed, and agree with the discharge instructions.

## 2024-04-04 NOTE — PROCEDURE
[FreeTextEntry1] :  Full PFT reveals normal flows; FEV1 was2.23  L which is 89 % of predicted, normal lung volumes, normal diffusions, at 14.7  L which is 79% predicted, normal flow volume loop. PFT's for performed to evaluate for Asthma and SOB.   FENO was 11; a normal value being less than 25Fractional exhaled nitric oxide (FENO) is regarded as a simple, noninvasive method for assessing eosinophilic airway inflammation. Produced by a variety of cells within the lung, nitric oxide (NO) concentrations are generally low in healthy individuals. However, high concentrations of NO appear to be involved in nonspecific host defense mechanisms and chronic inflammatory diseases such as asthma. The American Thoracic Society (ATS) therefore has strongly recommended using FENO to aid in the assessment, management, and long-term monitoring of eosinophilic airway inflammation and asthma, and for identifying steroid responsive individuals whose chronic respiratory symptoms may be caused by airway inflammation. In their 2011 clinical practice guideline, the ATS emphasizes the importance of using FENO.

## 2024-04-08 DIAGNOSIS — Z11.59 ENCOUNTER FOR SCREENING FOR OTHER VIRAL DISEASES: ICD-10-CM

## 2024-04-09 ENCOUNTER — APPOINTMENT (OUTPATIENT)
Dept: PULMONOLOGY | Facility: CLINIC | Age: 69
End: 2024-04-09
Payer: MEDICARE

## 2024-04-09 DIAGNOSIS — J02.9 ACUTE PHARYNGITIS, UNSPECIFIED: ICD-10-CM

## 2024-04-09 PROCEDURE — 99447 NTRPROF PH1/NTRNET/EHR 11-20: CPT

## 2024-04-10 LAB
RAPID RVP RESULT: NOT DETECTED
SARS-COV-2 RNA PNL RESP NAA+PROBE: NOT DETECTED

## 2024-04-16 ENCOUNTER — APPOINTMENT (OUTPATIENT)
Dept: CT IMAGING | Facility: CLINIC | Age: 69
End: 2024-04-16

## 2024-04-23 ENCOUNTER — APPOINTMENT (OUTPATIENT)
Dept: PULMONOLOGY | Facility: CLINIC | Age: 69
End: 2024-04-23
Payer: MEDICARE

## 2024-04-23 VITALS
OXYGEN SATURATION: 98 % | BODY MASS INDEX: 21.99 KG/M2 | SYSTOLIC BLOOD PRESSURE: 132 MMHG | RESPIRATION RATE: 16 BRPM | TEMPERATURE: 97.3 F | HEART RATE: 88 BPM | WEIGHT: 132 LBS | DIASTOLIC BLOOD PRESSURE: 74 MMHG | HEIGHT: 65 IN

## 2024-04-23 DIAGNOSIS — R05.8 OTHER SPECIFIED COUGH: ICD-10-CM

## 2024-04-23 PROCEDURE — 99214 OFFICE O/P EST MOD 30 MIN: CPT

## 2024-04-23 RX ORDER — GABAPENTIN 100 MG/1
100 CAPSULE ORAL
Qty: 1 | Refills: 0 | Status: ACTIVE | COMMUNITY
Start: 2024-04-23 | End: 1900-01-01

## 2024-04-23 NOTE — ASSESSMENT
[FreeTextEntry1] : Ms. Montoya is a 68-year-old female with a history of Crohn's disease, GERD, asthma, LPR, bronchiectasis/TBM - chronic multifactorial cough, ABIMAEL. She presents in person for an acute care visit. Her chief concern is persistent, "barky" cough x 2.5 weeks (since last in person visit, unsure if flared by PFTs performed then).   1. Cough: - Dynamic Chest CT RX'ed to re-eval TBM status.  - Patient s/p Azithro 500 mg x 5 days without benefit. - PO Pred from GI with slight improvement.    2. Bronchomalacia/bronchiectasis - Add Neurontin 100 QHS for now and increase to BID then possibly TID.  - continue Aerobika - mucus clearance device. - Will re-eval stats with Dynamic chest CT. Likely Dr. Ross visit needed. Eval with Raza in past, wants 2nd opinion.   3. Asthma: - continue Xopenex 0.63 via nebulizer or 2 puffs HFA q6H prn - continue Pulmicort 0.5% via nebulizer BID PRN - continue Qvar Redihaler 80 2 puffs BID (recently added to regimen). - continue Bevespi 2 puffs BID  4. GERD/ LPR -continue Desipramine 20mg qHS -continue Pepcid 40 mg QHS -continue Dexilant 60mg QD  Patient to follow up with Dr. Gardiner as scheduled for 8/22/2024. Patient to call with further questions and concerns. Patient verbalizes understanding of care plan and is agreeable.

## 2024-04-23 NOTE — DISCUSSION/SUMMARY
[FreeTextEntry1] : 08/01/2023 DYNAMIC CHEST CT COMPARISON: 2/8/2023 CT chest.  LUNGS/AIRWAYS/PLEURA: No endobronchial lesion. Unchanged mild bronchiectasis. No significant narrowing of the trachea or main bronchi with forced exhalation. Near collapse of the left lower lobar bronchus (9-64). No pleural effusion. Unchanged few sub-4 mm solid lung nodules, for example, the right upper lobe (2-37) left upper lobe 5 mm groundglass nodule (2-60).  IMPRESSION: With forced exhalation, severely narrowed left lower lobar bronchus, and maintained integrity of the trachea and main bronchi. Unchanged small groundglass and solid lung nodules.  4/9/2024 RVP NEGATIVE.
Fall Risk

## 2024-04-23 NOTE — REVIEW OF SYSTEMS
[Cough] : cough [Sputum] : sputum [Negative] : Endocrine [Chest Tightness] : no chest tightness [Dyspnea] : no dyspnea [Wheezing] : no wheezing [SOB on Exertion] : no sob on exertion

## 2024-04-23 NOTE — HISTORY OF PRESENT ILLNESS
[TextBox_4] : Ms. Montoya is a 68-year-old female with a history of Crohn's disease, GERD, asthma, LPR, bronchiectasis/TBM - chronic multifactorial cough, ABIMAEL. She presents in person for an acute care visit.  Her chief concern is persistent, "barky" cough x 2.5 weeks.   4/9/2024 TELEPHONE NOTE:  Patient contacted office yesterday and was advised to have RVP performed. Patient states she did go yesterday. DEREK Ambriz confirmed with lab that she did go - however, lab informed DEREK Ambriz that swab was performed incorrectly and was unable to be resulted.  Patient contacted office again today for a telephone visit.  Patient admits to known contact from sick  who has similar symptoms. She states her and her  symptoms have continued to persist with minimal improvement. She endorses productive cough with yellow mucus, but feels her cough has slightly improved. She states the lump in her throat/sore throat have worsened. She endorses decreased appetite.  She states she is using Albuterol and Budesonide via nebulizer. She states she is also complaint on Bevespi and Qvar BID. She states she feels sick and that her Chron's medications do not allow her to heal quickly from infections, so she is requesting abx. therapy.  She states she tested negative for Covid 19 twice (via at home test). She states she will repeat the RVP test tomorrow at [facility] in [place].  She denied fever, wheezing, nasal congestion or post nasal drip.   4/23/2024 ACUTE CARE IN PERSON VISIT:  Patient states that episodic, "barky" cough has persisted. She states that cough is worse at night.  She notes she has been using NyQuil to help sleep and control cough. She states she did try Mucinex, but felt that it dried her out too much.  She discussed symptoms with GI doctor who prescribed prednisone 20 mg.  She states she self increased it to 30 mg x 6 days, but still felt only slight improvement. She denies any benefit with use of rescue therapy.  She denies fever/chills, decreased appetite, increased fatigue, shortness of breath at rest or exertion, chest tightness, or wheezing.

## 2024-04-24 ENCOUNTER — APPOINTMENT (OUTPATIENT)
Dept: SURGERY | Facility: CLINIC | Age: 69
End: 2024-04-24
Payer: MEDICARE

## 2024-04-24 VITALS
SYSTOLIC BLOOD PRESSURE: 152 MMHG | OXYGEN SATURATION: 100 % | RESPIRATION RATE: 17 BRPM | DIASTOLIC BLOOD PRESSURE: 85 MMHG | HEART RATE: 79 BPM | TEMPERATURE: 97.1 F

## 2024-04-24 DIAGNOSIS — K60.2 ANAL FISSURE, UNSPECIFIED: ICD-10-CM

## 2024-04-24 DIAGNOSIS — K59.4 ANAL SPASM: ICD-10-CM

## 2024-04-24 DIAGNOSIS — K50.10 CROHN'S DISEASE OF LARGE INTESTINE W/OUT COMPLICATIONS: ICD-10-CM

## 2024-04-24 PROCEDURE — 46600 DIAGNOSTIC ANOSCOPY SPX: CPT

## 2024-04-24 PROCEDURE — 99213 OFFICE O/P EST LOW 20 MIN: CPT | Mod: 25

## 2024-04-24 RX ORDER — VEDOLIZUMAB 108 MG/.68ML
INJECTION, SOLUTION SUBCUTANEOUS
Refills: 0 | Status: ACTIVE | COMMUNITY

## 2024-04-24 NOTE — ASSESSMENT
[FreeTextEntry1] : Exam unchanged.  Patient without significant change in symptoms.  Topical diltiazem ineffective.  The patient is not interested in a trial of Botox.  She will continue with medical management as per GI.  Follow-up with me as needed.

## 2024-04-24 NOTE — PHYSICAL EXAM
[FreeTextEntry1] : Perianal inspection revealed thickened skin tags.  Digital exam revealed spasm and mild stenosis.  Anoscopy demonstrated several superficial fissures.  Anoscopy performed to evaluate anal canal.  No sedation required.

## 2024-04-24 NOTE — HISTORY OF PRESENT ILLNESS
[FreeTextEntry1] : Samantha is a 66 y/o female here for a follow up visit, rectal pain.   Currently on Entyvio and prednisone 20 mg daily.    Colonoscopy 07/14/23 by Dr. Win (Crohn's, Unspecified Site) - Erosions and exudate in the rectum. (Biopsy) Polyp in the ascending colon. (Polypectomy).  Last seen on 8/17/23 - I have seen and evaluated patient and I have corroborated all nursing input into this note. Patient with Crohn's disease. She has mild anal stenosis and severe anal sphincter spasm. She has associated superficial fissures and skin tags. I prescribed diltiazem cream. The patient has mild discomfort. I informed her that if her pain becomes more significant and problematic then Botox injections can be performed.  Today pt reports no pain. Did have some anal pain and swelling a few months ago - was put on prednisone and symptoms resolved. Every other day BMs, formed, no straining, takes Metamucil daily, denies pain, sometimes slight bleeding on tp, no episodes of incontinence, and currently denies feeling swollen or prolapsed tissue. Currently not taking any diltiazem cream - didnt help pt. Not taking any anticoagulants.

## 2024-05-01 ENCOUNTER — APPOINTMENT (OUTPATIENT)
Dept: CT IMAGING | Facility: IMAGING CENTER | Age: 69
End: 2024-05-01
Payer: MEDICARE

## 2024-05-01 ENCOUNTER — OUTPATIENT (OUTPATIENT)
Dept: OUTPATIENT SERVICES | Facility: HOSPITAL | Age: 69
LOS: 1 days | End: 2024-05-01
Payer: MEDICARE

## 2024-05-01 DIAGNOSIS — R93.89 ABNORMAL FINDINGS ON DIAGNOSTIC IMAGING OF OTHER SPECIFIED BODY STRUCTURES: ICD-10-CM

## 2024-05-01 PROCEDURE — 71250 CT THORAX DX C-: CPT | Mod: 26,MH

## 2024-05-01 PROCEDURE — 71250 CT THORAX DX C-: CPT

## 2024-05-08 ENCOUNTER — APPOINTMENT (OUTPATIENT)
Dept: PULMONOLOGY | Facility: CLINIC | Age: 69
End: 2024-05-08
Payer: MEDICARE

## 2024-05-08 VITALS
HEART RATE: 79 BPM | WEIGHT: 130 LBS | OXYGEN SATURATION: 98 % | SYSTOLIC BLOOD PRESSURE: 138 MMHG | BODY MASS INDEX: 20.4 KG/M2 | DIASTOLIC BLOOD PRESSURE: 84 MMHG | TEMPERATURE: 96.5 F | RESPIRATION RATE: 16 BRPM | HEIGHT: 67 IN

## 2024-05-08 DIAGNOSIS — J47.9 BRONCHIECTASIS, UNCOMPLICATED: ICD-10-CM

## 2024-05-08 PROCEDURE — 99214 OFFICE O/P EST MOD 30 MIN: CPT

## 2024-05-08 NOTE — HISTORY OF PRESENT ILLNESS
[TextBox_4] : Ms. Montoya is a 68-year-old female with a history of Crohn's disease, GERD, asthma, LPR, bronchiectasis/TBM - chronic multifactorial cough, ABIMAEL. She presents in person for a 2 week follow up visit from 4/23/2024 visit.   Her chief concern is persistent, "barky" cough x 1 month.   4/9/2024 TELEPHONE NOTE:  Patient contacted office yesterday and was advised to have RVP performed. Patient states she did go yesterday. DEREK Ambriz confirmed with lab that she did go - however, lab informed DEREK Ambriz that swab was performed incorrectly and was unable to be resulted.  Patient contacted office again today for a telephone visit.  Patient admits to known contact from sick  who has similar symptoms. She states her and her  symptoms have continued to persist with minimal improvement. She endorses productive cough with yellow mucus, but feels her cough has slightly improved. She states the lump in her throat/sore throat have worsened. She endorses decreased appetite.  She states she is using Albuterol and Budesonide via nebulizer. She states she is also complaint on Bevespi and Qvar BID. She states she feels sick and that her Chron's medications do not allow her to heal quickly from infections, so she is requesting abx. therapy.  She states she tested negative for Covid 19 twice (via at home test). She states she will repeat the RVP test tomorrow at [facility] in [place].  She denied fever, wheezing, nasal congestion or postnasal drip.  4/23/2024 ACUTE CARE IN PERSON VISIT:  Patient states that episodic, "barky" cough has persisted. She states that cough is worse at night.  She notes she has been using NyQuil to help sleep and control cough. She states she did try Mucinex, but felt that it dried her out too much.  She discussed symptoms with GI doctor who prescribed prednisone 20 mg.  She states she self-increased it to 30 mg x 6 days, but still felt only slight improvement. She denies any benefit with use of rescue therapy.  UPDATE 5/8/2024 IN PERSON VISIT:   Patient states she decreased her prednisone to 20 mg and continued to feel benefit in her cough. She states her GI then decreased her prednisone to 15 mg on 5/2 and she started to feel her cough worsening once again. She states she only feels that oral steroids helped control her cough. She states she feels more benefit from nebulized budesonide over lev albuterol nebulizer. She states she had follow-up dynamic CAT scan, but she does not see the results on her portal.  She denies fever/chills, decreased appetite, increased fatigue, shortness of breath at rest or exertion, chest tightness, or wheezing.

## 2024-05-08 NOTE — ASSESSMENT
[FreeTextEntry1] : Ms. Montoya is a 68-year-old female with a history of Crohn's disease, GERD, asthma, LPR, bronchiectasis/TBM - chronic multifactorial cough, ABIMAEL. She presents in person for a 2 week follow up visit from 4/23/2024 visit. Her chief concern is persistent, "barky" cough x 1 month.   1. Cough: - Patient s/p Dynamic chest CT; E-Mail sent to Radiology team to get report.  - Patient s/p Azithro 500 mg x 5 days without benefit. - PO Pred from GI with slight improvement - she states he decreased to 15 mg and she has since felt her cough worsen.    2. Bronchomalacia/bronchiectasis - Add Neurontin 100 QHS for now and increase to BID then possibly TID. Patient notes she has not yet tried Kari since her cough had improved.  - continue Aerobika - mucus clearance device. - Patient s/p repeat Dynamic chest CT; E-Mail sent to Radiology team to get report. Likely Dr. Ross visit needed. Eval with Raza in past, wants 2nd opinion.   3. Asthma: - continue Xopenex 0.63 via nebulizer or 2 puffs HFA q6H prn; - patient had decreased to Q AM dosing only. May try DuoNeb in the future to see if greater benefit to cough.  - continue Pulmicort 0.5% via nebulizer BID (advised need for BID dosing at this time as she decreased to Q AM). - continue Qvar Redihaler 80 2 puffs BID (recently added to regimen). - continue Bevespi 2 puffs BID  4. GERD/ LPR: -continue Desipramine 20mg qHS -continue Pepcid 40 mg QHS -continue Dexilant 60mg QD  Patient states she would like to schedule a follow up in 3 weeks - on schedule for 5/29/2024.  Patient to follow up with Dr. Gardiner as scheduled for 8/22/2024. Patient to call with further questions and concerns. Patient verbalizes understanding of care plan and is agreeable.

## 2024-05-08 NOTE — DISCUSSION/SUMMARY
[FreeTextEntry1] : 08/01/2023 DYNAMIC CHEST CT COMPARISON: 2/8/2023 CT chest.  LUNGS/AIRWAYS/PLEURA: No endobronchial lesion. Unchanged mild bronchiectasis. No significant narrowing of the trachea or main bronchi with forced exhalation. Near collapse of the left lower lobar bronchus (9-64). No pleural effusion. Unchanged few sub-4 mm solid lung nodules, for example, the right upper lobe (2-37) left upper lobe 5 mm groundglass nodule (2-60).  IMPRESSION: With forced exhalation, severely narrowed left lower lobar bronchus, and maintained integrity of the trachea and main bronchi. Unchanged small groundglass and solid lung nodules.  4/9/2024 RVP NEGATIVE.

## 2024-05-13 ENCOUNTER — NON-APPOINTMENT (OUTPATIENT)
Age: 69
End: 2024-05-13

## 2024-05-24 RX ORDER — MONTELUKAST 10 MG/1
10 TABLET, FILM COATED ORAL
Qty: 90 | Refills: 1 | Status: ACTIVE | COMMUNITY
Start: 2023-01-10 | End: 1900-01-01

## 2024-05-29 ENCOUNTER — APPOINTMENT (OUTPATIENT)
Dept: PULMONOLOGY | Facility: CLINIC | Age: 69
End: 2024-05-29
Payer: MEDICARE

## 2024-05-29 VITALS
SYSTOLIC BLOOD PRESSURE: 130 MMHG | WEIGHT: 135 LBS | DIASTOLIC BLOOD PRESSURE: 80 MMHG | HEART RATE: 96 BPM | HEIGHT: 67 IN | BODY MASS INDEX: 21.19 KG/M2 | TEMPERATURE: 97.1 F | RESPIRATION RATE: 18 BRPM | OXYGEN SATURATION: 97 %

## 2024-05-29 DIAGNOSIS — R09.3 ABNORMAL SPUTUM: ICD-10-CM

## 2024-05-29 DIAGNOSIS — K21.9 GASTRO-ESOPHAGEAL REFLUX DISEASE W/OUT ESOPHAGITIS: ICD-10-CM

## 2024-05-29 DIAGNOSIS — R09.82 POSTNASAL DRIP: ICD-10-CM

## 2024-05-29 DIAGNOSIS — J45.909 UNSPECIFIED ASTHMA, UNCOMPLICATED: ICD-10-CM

## 2024-05-29 PROCEDURE — 99214 OFFICE O/P EST MOD 30 MIN: CPT

## 2024-05-29 RX ORDER — AZELASTINE HYDROCHLORIDE 137 UG/1
0.1 SPRAY, METERED NASAL TWICE DAILY
Qty: 1 | Refills: 1 | Status: ACTIVE | COMMUNITY
Start: 2022-12-14 | End: 1900-01-01

## 2024-05-29 RX ORDER — AZITHROMYCIN 500 MG/1
500 TABLET, FILM COATED ORAL DAILY
Qty: 5 | Refills: 0 | Status: DISCONTINUED | COMMUNITY
Start: 2024-04-09 | End: 2024-05-29

## 2024-05-29 NOTE — HISTORY OF PRESENT ILLNESS
[TextBox_4] : Ms. Montoya is a 68-year-old female with a history of Crohn's disease, GERD, asthma, LPR, bronchiectasis/TBM - chronic multifactorial cough, ABIMAEL. She presents in person for an acute care visit.  Her chief concern is cough and increased mucus congestion x 2 weeks.   4/9/2024: Patient contacted office yesterday and was advised to have RVP performed. Patient states she did go yesterday. DEREK Ambriz confirmed with lab that she did go - however, lab informed DEREK Ambriz that swab was performed incorrectly and was unable to be resulted.  Patient contacted office again today for a telephone visit.  Patient admits to known contact from sick  who has similar symptoms. She states her and her  symptoms have continued to persist with minimal improvement. She endorses productive cough with yellow mucus, but feels her cough has slightly improved. She states the lump in her throat/sore throat have worsened. She endorses decreased appetite.  She states she is using Albuterol and Budesonide via nebulizer. She states she is also complaint on Bevespi and Qvar BID. She states she feels sick and that her Chron's medications do not allow her to heal quickly from infections, so she is requesting abx. therapy.  She states she tested negative for Covid 19 twice (via at home test). She states she will repeat the RVP test tomorrow at [facility] in [place].  4/23/2024: Patient states that episodic, "barky" cough has persisted. She states that cough is worse at night.  She notes she has been using NyQuil to help sleep and control cough. She states she did try Mucinex, but felt that it dried her out too much.  She discussed symptoms with GI doctor who prescribed prednisone 20 mg.  She states she self increased it to 30 mg x 6 days, but still felt only slight improvement. She denies any benefit with use of rescue therapy.  5/8/2024: Patient states she decreased her prednisone to 20 mg and continued to feel benefit in her cough. She states her GI then decreased her prednisone to 15 mg on 5/2 and she started to feel her cough worsening once again. She states she only feels that oral steroids helped control her cough. She states she feels more benefit from nebulized budesonide over lev albuterol nebulizer. She states she had follow-up dynamic CAT scan, but she does not see the results on her portal.  5/29/2024 UPDATE IN PERSON VISIT:   Patient states after 5/8 visit, she felt slight improvement in cough. She states she then had a sore throat on 5/13 followed by head congestion 5/14 and worsening cough. She presented to ENT, Dr. Perlman, office for evaluation. She states he performed scope and advised her there was increase mucus present. He RX'ed Doxycycline 100 mg PO BID x 10 days, which she has since completed. She states she has also taken Mucinex daily.   She states her current dose of Prednisone (managed by GI) is 10 mg. She notes she no longer feels benefit from OCS.  She feels her Entyvio therapy is decreasing her immune system as she is frequently sick.  She states she has nebulized BID as discussed at last visit. She continues on inhaler regimen as discussed. She did not initiate Gabapentin therapy as she continues to feel apprehensive.  She now does endorse post nasal drip, noting she she does not take nasal spray, but has at home.   She denies fever/chills, decreased appetite, increased fatigue, shortness of breath at rest or exertion, chest tightness, or wheezing.

## 2024-05-29 NOTE — ASSESSMENT
[FreeTextEntry1] : Ms. Montoya is a 68-year-old female with a history of Crohn's disease, GERD, asthma, LPR, bronchiectasis/TBM - chronic multifactorial cough, ABIMAEL. She presents in person for an acute care visit. Her chief concern is cough and increased mucus congestion x 2 weeks.   1. Cough: - r/t TBM: upcoming visit with Dr. Ross.  - r/t recent virus and postnasal drip. Add Azelastine Nasal Spray. 2 squirts each nostril BID. - Patient s/p Azithro 500 mg x 5 days without benefit (4/9/2024) then on Doxycycline x 10 days mid-may).  - Currently on PO Pred managed by GI MD; current dose of 10 mg - no longer feeling benefit from OCS.  - Add Sputum Culture & Sens/Fungal to be done 6/3/2024. Patient notes she has sputum cups at home; last abx completed on 5/25/2024.   2. TBM: - Will hold on Neurontin 100 QHS for now (future plan to likely add and then increase slowly from QHS to BID then possibly TID).  - continue Aerobika - mucus clearance device. - s/p Dynamic Chest CT; consult with Dr. Ross 6/6/2024. She is s/p Eval with Raza in past, wants 2nd opinion.   3. Asthma: - continue Xopenex 0.63 via nebulizer or 2 puffs HFA q6H prn - continue Pulmicort 0.5% via nebulizer BID PRN - continue Qvar Redihaler 80 2 puffs BID (recently added to regimen). - continue Bevespi 2 puffs BID  4. GERD/ LPR -continue Desipramine 20mg qHS -continue Pepcid 40 mg QHS -continue Dexilant 60mg QD  Patient to follow up with Dr. Gardiner as scheduled for 8/22/2024. Patient to call with further questions and concerns. Patient verbalizes understanding of care plan and is agreeable.

## 2024-05-31 ENCOUNTER — NON-APPOINTMENT (OUTPATIENT)
Age: 69
End: 2024-05-31

## 2024-06-04 NOTE — ED ADULT NURSE NOTE - ED STAT RN HAND OFF
Handoff

## 2024-06-06 ENCOUNTER — APPOINTMENT (OUTPATIENT)
Dept: THORACIC SURGERY | Facility: CLINIC | Age: 69
End: 2024-06-06
Payer: MEDICARE

## 2024-06-06 VITALS
HEIGHT: 67 IN | TEMPERATURE: 97 F | WEIGHT: 136 LBS | BODY MASS INDEX: 21.35 KG/M2 | OXYGEN SATURATION: 97 % | SYSTOLIC BLOOD PRESSURE: 142 MMHG | DIASTOLIC BLOOD PRESSURE: 67 MMHG | HEART RATE: 88 BPM

## 2024-06-06 DIAGNOSIS — J39.8 OTHER SPECIFIED DISEASES OF UPPER RESPIRATORY TRACT: ICD-10-CM

## 2024-06-06 PROCEDURE — 99203 OFFICE O/P NEW LOW 30 MIN: CPT

## 2024-06-06 RX ORDER — FLUCONAZOLE 100 MG/1
100 TABLET ORAL
Qty: 15 | Refills: 0 | Status: DISCONTINUED | COMMUNITY
Start: 2023-04-19 | End: 2024-06-06

## 2024-06-06 RX ORDER — GABAPENTIN 100 MG/1
100 CAPSULE ORAL 3 TIMES DAILY
Qty: 90 | Refills: 2 | Status: DISCONTINUED | COMMUNITY
Start: 2023-05-04 | End: 2024-06-06

## 2024-06-06 RX ORDER — NYSTATIN 100000 [USP'U]/ML
100000 SUSPENSION ORAL
Qty: 480 | Refills: 2 | Status: DISCONTINUED | COMMUNITY
Start: 2023-11-30 | End: 2024-06-06

## 2024-06-06 RX ORDER — CICLESONIDE 160 UG/1
160 AEROSOL, METERED RESPIRATORY (INHALATION) TWICE DAILY
Qty: 1 | Refills: 3 | Status: DISCONTINUED | COMMUNITY
Start: 2023-05-04 | End: 2024-06-06

## 2024-06-06 RX ORDER — BENZONATATE 200 MG/1
200 CAPSULE ORAL 3 TIMES DAILY
Qty: 60 | Refills: 0 | Status: DISCONTINUED | COMMUNITY
Start: 2023-04-13 | End: 2024-06-06

## 2024-06-06 RX ORDER — BECLOMETHASONE DIPROPIONATE HFA 80 UG/1
80 AEROSOL, METERED RESPIRATORY (INHALATION) TWICE DAILY
Qty: 1 | Refills: 3 | Status: DISCONTINUED | COMMUNITY
Start: 2019-10-17 | End: 2024-06-06

## 2024-06-06 RX ORDER — ZOLEDRONIC ACID 5 MG/100ML
5 INJECTION, SOLUTION INTRAVENOUS
Qty: 1 | Refills: 0 | Status: DISCONTINUED | COMMUNITY
Start: 2018-02-27 | End: 2024-06-06

## 2024-06-06 RX ORDER — BUDESONIDE AND FORMOTEROL FUMARATE DIHYDRATE 160; 4.5 UG/1; UG/1
160-4.5 AEROSOL RESPIRATORY (INHALATION) TWICE DAILY
Qty: 1 | Refills: 3 | Status: DISCONTINUED | COMMUNITY
End: 2024-06-06

## 2024-06-06 NOTE — PHYSICAL EXAM
[Fully active, able to carry on all pre-disease performance without restriction] : Status 0 - Fully active, able to carry on all pre-disease performance without restriction [Sclera] : the sclera and conjunctiva were normal [Extraocular Movements] : extraocular movements were intact [Neck Appearance] : the appearance of the neck was normal [Neck Cervical Mass (___cm)] : no neck mass was observed [Jugular Venous Distention Increased] : there was no jugular-venous distention [Respiration, Rhythm And Depth] : normal respiratory rhythm and effort [Exaggerated Use Of Accessory Muscles For Inspiration] : no accessory muscle use [Auscultation Breath Sounds / Voice Sounds] : lungs were clear to auscultation bilaterally [Heart Rate And Rhythm] : heart rate was normal and rhythm regular [Examination Of The Chest] : the chest was normal in appearance [Chest Visual Inspection Thoracic Asymmetry] : no chest asymmetry [Abnormal Walk] : normal gait [Nail Clubbing] : no clubbing  or cyanosis of the fingernails [Skin Color & Pigmentation] : normal skin color and pigmentation [] : no rash [Sensation] : the sensory exam was normal to light touch and pinprick [Motor Exam] : the motor exam was normal [Oriented To Time, Place, And Person] : oriented to person, place, and time [Affect] : the affect was normal [Mood] : the mood was normal

## 2024-06-07 NOTE — ASSESSMENT
[FreeTextEntry1] : Patient is a 68 year old female referred for evaluation of tracheobronchomalacia (TBM). She has a history of Crohn's disease and GERD. She has been on varying doses of Prednisone since she was 18 years old. She takes Dexilant for her reflux. She takes Entyvio for her IBD. She is on multiple respiratory medications.   Patient's primary symptom is cough. She has no dyspnea on exertion and no history of recurrent pneumonia. Her cough is not lifestyle limiting. She had CT scan that showed significant central airway collapse. Discussed the role of CT scan as a screening tool for diagnosing excessive central airway collapse (ECAC) and TBM. Discussed the gold standard diagnostic test is an awake dynamic bronchoscopy.   Discussed etiology of her airway issues. It is multifactorial. Most likely secondary to long term steroid and biologic medication use, as well as reflux and small airway disease. Discussed role of surgery for severe symptomatic ECAC and TBM. Surgery is reserved for patients with severe, symptomatic ECAC and TBM. Currently, her symptoms are not lifestyle limiting and because of the medications that she is required to take for IBD, I do not think we should rush to an operation. Steroids and biologics may impair scarring and healing that may lead to a suboptimal result. Redo surgery is not always an option either. She understood these recommendations and agreed. She would like to proceed with awake bronchoscopy to determine extent of collapse. We will proceed with this procedure at the end of the month. In the meantime, she will continue follow up with Dr. Gardiner and watch her symptoms. If they do become debilitating, I think she would be a good candidate for a stent trial prior to surgery, again because of her comorbidities and medications. Patient understood and agreed.   PLAN 1.  Awake bronchoscopy on June 21. 2024; will discuss results at the time and need for follow up.  2.  Follow up with Dr. Gardiner.     I, Dr. Ross, personally performed the evaluation and management (E/M) services for this established patient who presents today with (a) new problem(s)/exacerbation of (an) existing condition(s). That E/M includes conducting the clinically appropriate interval history &/or exam, assessing all new/exacerbated conditions, and establishing a new plan of care. Today, my MALIHA, [[employer]], was here to observe my evaluation and management service for this new problem/exacerbated condition and follow the plan of care established by me going forward.

## 2024-06-07 NOTE — CONSULT LETTER
[Dear  ___] : Dear  [unfilled], [Consult Letter:] : I had the pleasure of evaluating your patient, [unfilled]. [Please see my note below.] : Please see my note below. [Consult Closing:] : Thank you very much for allowing me to participate in the care of this patient.  If you have any questions, please do not hesitate to contact me. [Sincerely,] : Sincerely, [FreeTextEntry2] : DR. ADAM EDUARDO [FreeTextEntry3] : Vincent Ross MD   Attending Thoracic Surgeon  Department of Cardiovascular and Thoracic Surgery   of Cardiothoracic Surgery  [employer] at [facility], Division of Thoracic Surgery  [address] [id number]  Tel: [phone]  Fax: [phone]

## 2024-06-07 NOTE — HISTORY OF PRESENT ILLNESS
[FreeTextEntry1] : Patient is a 68-year-old female, former smoker (socially while in college quit > 40 yrs ago), w/ a PMHx of Crohn's disease, BCC of scalp, asthma, GERD. LPR, bronchiectasis/TBM - chronic multifactorial cough and ABIMAEL. She has c/o increased cough with mucous production. She is s/p Azithro 500 mg x 5 days without benefit (4/9/2024) then on Doxycycline x 10 days mid-May for episodes of bronchitis. Currently on PO Prednisone managed by GI Dr. Sedrick Win; current dose of 10 mg. She was previously on 40mg in early January 2024. PSHx of a possible resection of the ileum and appendectomy in 1997. Denies family hx of CAD but notable for prostate CA in her father. She is referred to the practice by her pulmonologist Dr. Hal Gardiner.   Presents today for CT surgery consultation. She saw Dr. Pranav Person in 2023 who suggested an awake bronch, CT chest in one year to follow lung nodules, and a referral to GI for BRAVO. She opted not to complete the recommendations. She presents today for a second opinion after prior workup in the past by GI showed her cough is not secondary to GERD.   Recent results are as follows.   CT Chest 5/1/24: IMPRESSION: Tracheobronchomalacia. Unchanged small groundglass and solid lung nodules. **ADDENDUM # 1- The degree of airway collapse is new compared to 8/1/2023, but unchanged compared to 2/8/2023. Note that comparison of the studies assumes equal expiratory effort between exams.  PFT done on 4/4/24 showed FEV1 =2.23, 89% of predicted value, FVC =2.77, 87% of predicted value, PEF =2.94 , 50% of predicted value and DLCO =14.7, 79% of predicted value.  OF NOTE: Seen by ENT (Dr. Perlman) Patient reports vocal cords were normal.  She reports that her cough has subsided in the past week. She has utilized multiple agents to alleviate her cough including nebs, prednisone, and Nyquil. States her cough is no longer productive in nature. She denies SOB or MTZ and usually ambulates 3 miles or > daily. She notes being on multiple agents for mgmt of Crohn's and is currently on Entivio.

## 2024-06-07 NOTE — CONSULT LETTER
[Dear  ___] : Dear  [unfilled], [Consult Letter:] : I had the pleasure of evaluating your patient, [unfilled]. [Please see my note below.] : Please see my note below. [Consult Closing:] : Thank you very much for allowing me to participate in the care of this patient.  If you have any questions, please do not hesitate to contact me. [Sincerely,] : Sincerely, [FreeTextEntry2] : DR. ADAM EDUARDO [FreeTextEntry3] : Vincent Ross MD   Attending Thoracic Surgeon  Department of Cardiovascular and Thoracic Surgery   of Cardiothoracic Surgery  [employer] at [facility], Division of Thoracic Surgery  [address] [id number]  Tel: [phone]  Fax: [phone]

## 2024-06-07 NOTE — DATA REVIEWED
[FreeTextEntry1] : CT chest dynamic 8/1/23: -With forced exhalation, severely narrowed left lower lobar bronchus, and maintained integrity of the trachea and main bronchi. -Unchanged small groundglass and solid lung nodules.  CT chest dynamic on 02/08/2023: - Mild bronchiectasis in both lungs. - Few mucous impacted bronchi in the right upper lobe.

## 2024-06-07 NOTE — SOCIAL HISTORY
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

## 2024-06-07 NOTE — ASSESSMENT
[FreeTextEntry1] : Patient is a 68 year old female referred for evaluation of tracheobronchomalacia (TBM). She has a history of Crohn's disease and GERD. She has been on varying doses of Prednisone since she was 18 years old. She takes Dexilant for her reflux. She takes Entyvio for her IBD. She is on multiple respiratory medications.   Patient's primary symptom is cough. She has no dyspnea on exertion and no history of recurrent pneumonia. Her cough is not lifestyle limiting. She had CT scan that showed significant central airway collapse. Discussed the role of CT scan as a screening tool for diagnosing excessive central airway collapse (ECAC) and TBM. Discussed the gold standard diagnostic test is an awake dynamic bronchoscopy.   Discussed etiology of her airway issues. It is multifactorial. Most likely secondary to long term steroid and biologic medication use, as well as reflux and small airway disease. Discussed role of surgery for severe symptomatic ECAC and TBM. Surgery is reserved for patients with severe, symptomatic ECAC and TBM. Currently, her symptoms are not lifestyle limiting and because of the medications that she is required to take for IBD, I do not think we should rush to an operation. Steroids and biologics may impair scarring and healing that may lead to a suboptimal result. Redo surgery is not always an option either. She understood these recommendations and agreed. She would like to proceed with awake bronchoscopy to determine extent of collapse. We will proceed with this procedure at the end of the month. In the meantime, she will continue follow up with Dr. Gardiner and watch her symptoms. If they do become debilitating, I think she would be a good candidate for a stent trial prior to surgery, again because of her comorbidities and medications. Patient understood and agreed.   PLAN 1.  Awake bronchoscopy on June 21. 2024; will discuss results at the time and need for follow up.  2.  Follow up with Dr. Gardiner.     I, Dr. Ross, personally performed the evaluation and management (E/M) services for this established patient who presents today with (a) new problem(s)/exacerbation of (an) existing condition(s). That E/M includes conducting the clinically appropriate interval history &/or exam, assessing all new/exacerbated conditions, and establishing a new plan of care. Today, my MALIHA, [[place]], was here to observe my evaluation and management service for this new problem/exacerbated condition and follow the plan of care established by me going forward.

## 2024-06-07 NOTE — HISTORY OF PRESENT ILLNESS
[FreeTextEntry1] : Patient is a 68-year-old female, former smoker (socially while in college quit > 40 yrs ago), w/ a PMHx of Crohn's disease, BCC of scalp, asthma, GERD. LPR, bronchiectasis/TBM - chronic multifactorial cough and ABIMAEL. She has c/o increased cough with mucous production. She is s/p Azithro 500 mg x 5 days without benefit (4/9/2024) then on Doxycycline x 10 days mid-May for episodes of bronchitis. Currently on PO Prednisone managed by GI Dr. Sedrick Win; current dose of 10 mg. She was previously on 40mg in early January 2024. PSHx of a possible resection of the ileum and appendectomy in 1997. Denies family hx of CAD but notable for prostate CA in her father. She is referred to the practice by her pulmonologist Dr. Hal Gardiner.   Presents today for CT surgery consultation. She saw Dr. Pranva Person in 2023 who suggested an awake bronch, CT chest in one year to follow lung nodules, and a referral to GI for BRAVO. She opted not to complete the recommendations. She presents today for a second opinion after prior workup in the past by GI showed her cough is not secondary to GERD.   Recent results are as follows.   CT Chest 5/1/24: IMPRESSION: Tracheobronchomalacia. Unchanged small groundglass and solid lung nodules. **ADDENDUM # 1- The degree of airway collapse is new compared to 8/1/2023, but unchanged compared to 2/8/2023. Note that comparison of the studies assumes equal expiratory effort between exams.  PFT done on 4/4/24 showed FEV1 =2.23, 89% of predicted value, FVC =2.77, 87% of predicted value, PEF =2.94 , 50% of predicted value and DLCO =14.7, 79% of predicted value.  OF NOTE: Seen by ENT (Dr. Perlman) Patient reports vocal cords were normal.  She reports that her cough has subsided in the past week. She has utilized multiple agents to alleviate her cough including nebs, prednisone, and Nyquil. States her cough is no longer productive in nature. She denies SOB or MTZ and usually ambulates 3 miles or > daily. She notes being on multiple agents for mgmt of Crohn's and is currently on Entivio.

## 2024-06-11 RX ORDER — LEVALBUTEROL HYDROCHLORIDE 0.63 MG/3ML
0.63 SOLUTION RESPIRATORY (INHALATION)
Qty: 1080 | Refills: 1 | Status: ACTIVE | COMMUNITY
Start: 2023-11-24 | End: 1900-01-01

## 2024-06-13 ENCOUNTER — APPOINTMENT (OUTPATIENT)
Dept: PULMONOLOGY | Facility: CLINIC | Age: 69
End: 2024-06-13
Payer: MEDICARE

## 2024-06-13 DIAGNOSIS — R93.89 ABNORMAL FINDINGS ON DIAGNOSTIC IMAGING OF OTHER SPECIFIED BODY STRUCTURES: ICD-10-CM

## 2024-06-13 DIAGNOSIS — R05.9 COUGH, UNSPECIFIED: ICD-10-CM

## 2024-06-13 DIAGNOSIS — J45.909 UNSPECIFIED ASTHMA, UNCOMPLICATED: ICD-10-CM

## 2024-06-13 DIAGNOSIS — K21.9 GASTRO-ESOPHAGEAL REFLUX DISEASE W/OUT ESOPHAGITIS: ICD-10-CM

## 2024-06-13 PROCEDURE — 99442: CPT | Mod: 93

## 2024-06-17 RX ORDER — GLYCOPYRROLATE AND FORMOTEROL FUMARATE 9; 4.8 UG/1; UG/1
2 AEROSOL, METERED RESPIRATORY (INHALATION)
Refills: 0 | DISCHARGE

## 2024-06-17 RX ORDER — BUDESONIDE, MICRONIZED 100 %
2 POWDER (GRAM) MISCELLANEOUS
Refills: 0 | DISCHARGE

## 2024-06-17 RX ORDER — AZELASTINE 137 UG/1
1 SPRAY, METERED NASAL
Refills: 0 | DISCHARGE

## 2024-06-17 RX ORDER — MONTELUKAST 4 MG/1
1 TABLET, CHEWABLE ORAL
Refills: 0 | DISCHARGE

## 2024-06-17 RX ORDER — GABAPENTIN 400 MG/1
1 CAPSULE ORAL
Refills: 0 | DISCHARGE

## 2024-06-17 RX ORDER — DEXLANSOPRAZOLE 30 MG/1
1 CAPSULE, DELAYED RELEASE ORAL
Refills: 0 | DISCHARGE

## 2024-06-17 RX ORDER — LEVALBUTEROL 1.25 MG/.5ML
3 SOLUTION, CONCENTRATE RESPIRATORY (INHALATION)
Refills: 0 | DISCHARGE

## 2024-06-17 RX ORDER — ALBUTEROL 90 UG/1
2 AEROSOL, METERED ORAL
Refills: 0 | DISCHARGE

## 2024-06-17 RX ORDER — IBUPROFEN 200 MG
1 TABLET ORAL
Refills: 0 | DISCHARGE

## 2024-06-17 RX ORDER — BECLOMETHASONE DIPROPIONATE 40 UG/1
2 AEROSOL, METERED RESPIRATORY (INHALATION)
Refills: 0 | DISCHARGE

## 2024-06-17 RX ORDER — ALPRAZOLAM 0.25 MG
1 TABLET ORAL
Qty: 0 | Refills: 0 | DISCHARGE

## 2024-06-17 NOTE — H&P ADULT - NSHPPHYSICALEXAM_GEN_ALL_CORE
WT: 136 lbs    ECOG Performance Status: Status 0 - Fully active, able to carry on all pre-disease performance without restriction.     Eyes: the sclera and conjunctiva were normal and extraocular movements were intact.  Neck: the appearance of the neck was normal and no neck mass was observed . there was no jugular-venous distention.  Pulmonary: no respiratory distress, normal respiratory rhythm and effort, no accessory muscle use and lungs were clear to auscultation bilaterally.  Heart: heart rate was normal and rhythm regular.  Chest: the chest was normal in appearance and no chest asymmetry.  Musculoskeletal: normal gait and no clubbing or cyanosis of the fingernails.  Skin: normal skin color and pigmentation and no rash.  Neurological: the sensory exam was normal to light touch and pinprick and the motor exam was normal.  Psychiatric: oriented to person, place, and time, the affect was normal and the mood was normal.

## 2024-06-17 NOTE — H&P ADULT - NSHPSOCIALHISTORY_GEN_ALL_CORE
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

## 2024-06-17 NOTE — H&P ADULT - NSHPREVIEWOFSYSTEMS_GEN_ALL_CORE
ENT: as noted in HPI.    Respiratory: as noted in HPI.    Gastrointestinal: as noted in HPI.    Integumentary: as noted in HPI.    Constitutional, Eyes, Cardiovascular, Genitourinary, Musculoskeletal, Neurological, Psychiatric, Endocrine and Heme/Lymph are otherwise negative.

## 2024-06-17 NOTE — H&P ADULT - ASSESSMENT
Patient is a 68 year old female referred for evaluation of tracheobronchomalacia (TBM). She has a history of Crohn's disease and GERD. She has been on varying doses of Prednisone since she was 18 years old. She takes Dexilant for her reflux. She takes Entyvio for her IBD. She is on multiple respiratory medications.    Patient's primary symptom is cough. She has no dyspnea on exertion and no history of recurrent pneumonia. Her cough is not lifestyle limiting. She had CT scan that showed significant central airway collapse. Discussed the role of CT scan as a screening tool for diagnosing excessive central airway collapse (ECAC) and TBM. Discussed the gold standard diagnostic test is an awake dynamic bronchoscopy.    Discussed etiology of her airway issues. It is multifactorial. Most likely secondary to long term steroid and biologic medication use, as well as reflux and small airway disease. Discussed role of surgery for severe symptomatic ECAC and TBM. Surgery is reserved for patients with severe, symptomatic ECAC and TBM. Currently, her symptoms are not lifestyle limiting and because of the medications that she is required to take for IBD, I do not think we should rush to an operation. Steroids and biologics may impair scarring and healing that may lead to a suboptimal result. Redo surgery is not always an option either. She understood these recommendations and agreed. She would like to proceed with awake bronchoscopy to determine extent of collapse. We will proceed with this procedure at the end of the month. In the meantime, she will continue follow up with Dr. Gardiner and watch her symptoms. If they do become debilitating, I think she would be a good candidate for a stent trial prior to surgery, again because of her comorbidities and medications. Patient understood and agreed.    PLAN  1. Awake bronchoscopy on June 21. 2024; will discuss results at the time and need for follow up.  2. Follow up with Dr. Gardiner.

## 2024-06-17 NOTE — H&P ADULT - NSICDXPASTMEDICALHX_GEN_ALL_CORE_FT
PAST MEDICAL HISTORY:  Allergic rhinitis, seasonal     Asthma     Atypical chest pain     Basal Cell Carcinoma, Face     Bronchiectasis     Cough in adult     Crohn's Disease     Disc degeneration, lumbar     GERD (gastroesophageal reflux disease)     Immunodeficiency     LPRD (laryngopharyngeal reflux disease)     Multiple pulmonary nodules     SOB (shortness of breath)     Thyroid nodule     Tracheobronchomalacia

## 2024-06-17 NOTE — H&P ADULT - HISTORY OF PRESENT ILLNESS
Patient is a 68-year-old female, former smoker (socially while in college quit > 40 yrs ago), w/ a PMHx of Crohn's disease, BCC of scalp, asthma, GERD. LPR, bronchiectasis/TBM - chronic multifactorial cough and ABIMAEL. She has c/o increased cough with mucous production. She is s/p Azithro 500 mg x 5 days without benefit (4/9/2024) then on Doxycycline x 10 days mid-May for episodes of bronchitis. Currently on PO Prednisone managed by GI Dr. Sedrick Win; current dose of 10 mg. She was previously on 40mg in early January 2024. PSHx of a possible resection of the ileum and appendectomy in 1997. Denies family hx of CAD but notable for prostate CA in her father. She is referred to the practice by her pulmonologist Dr. Hal Gardiner.    Presents today for CT surgery consultation. She saw Dr. Pranav Person in 2023 who suggested an awake bronch, CT chest in one year to follow lung nodules, and a referral to GI for BRAVO. She opted not to complete the recommendations. She presents today for a second opinion after prior workup in the past by GI showed her cough is not secondary to GERD.    Recent results are as follows.    CT Chest 5/1/24:  IMPRESSION: Tracheobronchomalacia.  Unchanged small groundglass and solid lung nodules.  **ADDENDUM # 1- The degree of airway collapse is new compared to 8/1/2023, but unchanged compared to 2/8/2023. Note that comparison of the studies assumes equal expiratory effort between exams.    PFT done on 4/4/24 showed FEV1 =2.23, 89% of predicted value, FVC =2.77, 87% of predicted value, PEF =2.94 , 50% of predicted value and DLCO =14.7, 79% of predicted value.    OF NOTE: Seen by ENT (Dr. Perlman) Patient reports vocal cords were normal.    She reports that her cough has subsided in the past week. She has utilized multiple agents to alleviate her cough including nebs, prednisone, and Nyquil. States her cough is no longer productive in nature. She denies SOB or MTZ and usually ambulates 3 miles or > daily. She notes being on multiple agents for mgmt of Crohn's and is currently on Entivio.

## 2024-06-17 NOTE — H&P ADULT - NSICDXPASTSURGICALHX_GEN_ALL_CORE_FT
PAST SURGICAL HISTORY:  H/O knee surgery     History of parathyroid surgery     History of partial colectomy     History of subtotal thyroidectomy     S/P Colon Resection

## 2024-06-18 ENCOUNTER — NON-APPOINTMENT (OUTPATIENT)
Age: 69
End: 2024-06-18

## 2024-06-20 ENCOUNTER — TRANSCRIPTION ENCOUNTER (OUTPATIENT)
Age: 69
End: 2024-06-20

## 2024-06-21 ENCOUNTER — APPOINTMENT (OUTPATIENT)
Dept: THORACIC SURGERY | Facility: HOSPITAL | Age: 69
End: 2024-06-21

## 2024-06-21 ENCOUNTER — OUTPATIENT (OUTPATIENT)
Dept: OUTPATIENT SERVICES | Facility: HOSPITAL | Age: 69
LOS: 1 days | Discharge: HOME | End: 2024-06-21
Payer: MEDICARE

## 2024-06-21 ENCOUNTER — NON-APPOINTMENT (OUTPATIENT)
Age: 69
End: 2024-06-21

## 2024-06-21 VITALS
RESPIRATION RATE: 18 BRPM | OXYGEN SATURATION: 99 % | DIASTOLIC BLOOD PRESSURE: 88 MMHG | HEIGHT: 67 IN | WEIGHT: 134.04 LBS | TEMPERATURE: 98 F | HEART RATE: 82 BPM | SYSTOLIC BLOOD PRESSURE: 152 MMHG

## 2024-06-21 DIAGNOSIS — E89.0 POSTPROCEDURAL HYPOTHYROIDISM: Chronic | ICD-10-CM

## 2024-06-21 DIAGNOSIS — Z98.890 OTHER SPECIFIED POSTPROCEDURAL STATES: Chronic | ICD-10-CM

## 2024-06-21 DIAGNOSIS — Z90.49 ACQUIRED ABSENCE OF OTHER SPECIFIED PARTS OF DIGESTIVE TRACT: Chronic | ICD-10-CM

## 2024-06-21 PROCEDURE — 31622 DX BRONCHOSCOPE/WASH: CPT

## 2024-06-21 RX ORDER — LIDOCAINE 4 G/100G
60 CREAM TOPICAL ONCE
Refills: 0 | Status: DISCONTINUED | OUTPATIENT
Start: 2024-06-21 | End: 2024-06-21

## 2024-06-21 NOTE — PRE-ANESTHESIA EVALUATION ADULT - NSDENTALSD_ENT_ALL_CORE
Missing several rear molars. Poor dentition with several chipped teeth, ground/worn teeth, cavities./missing teeth

## 2024-06-21 NOTE — PRE-ANESTHESIA EVALUATION ADULT - NSANTHPMHFT_GEN_ALL_CORE
Cardiac: Positive for HLD. Denies HTN, MI/Angina/Heart Failure, Arrhythmia, Murmur/Valvular Disorder. >4 METS  Pulmonary: Positive for tracheobronchomalacia, lung nodule, chronic cough, asthma, former smoking, shortness of breath.   Renal: Denies kidney dysfunction  Hepatic: Denies liver dysfunction  Gastrointestinal: Positive for GERD/Heartburn, Crohn's disease.  Endocrine: Positive for parathyroid gland removal. Denies DM or thyroid dysfunction.  Neurologic: Denies stroke/seizure disorder  Infectious: Positive for herpes zoster, currently dormant.   Dermatologic: BCC of scalp.    PSH: Subtotal thyroidectomy, partial colectomy, parathyroid surgery, knee surgery.

## 2024-06-26 ENCOUNTER — APPOINTMENT (OUTPATIENT)
Dept: OPHTHALMOLOGY | Facility: CLINIC | Age: 69
End: 2024-06-26
Payer: MEDICARE

## 2024-06-26 ENCOUNTER — NON-APPOINTMENT (OUTPATIENT)
Age: 69
End: 2024-06-26

## 2024-06-26 PROCEDURE — 92014 COMPRE OPH EXAM EST PT 1/>: CPT

## 2024-06-27 PROBLEM — R07.89 OTHER CHEST PAIN: Chronic | Status: ACTIVE | Noted: 2024-06-17

## 2024-06-27 PROBLEM — J30.2 OTHER SEASONAL ALLERGIC RHINITIS: Chronic | Status: ACTIVE | Noted: 2024-06-17

## 2024-06-27 PROBLEM — K21.9 GASTRO-ESOPHAGEAL REFLUX DISEASE WITHOUT ESOPHAGITIS: Chronic | Status: ACTIVE | Noted: 2024-06-17

## 2024-06-27 PROBLEM — R05.9 COUGH, UNSPECIFIED: Chronic | Status: ACTIVE | Noted: 2024-06-17

## 2024-06-27 PROBLEM — M51.36 OTHER INTERVERTEBRAL DISC DEGENERATION, LUMBAR REGION: Chronic | Status: ACTIVE | Noted: 2024-06-17

## 2024-06-27 PROBLEM — R06.02 SHORTNESS OF BREATH: Chronic | Status: ACTIVE | Noted: 2024-06-17

## 2024-06-27 PROBLEM — J47.9 BRONCHIECTASIS, UNCOMPLICATED: Chronic | Status: ACTIVE | Noted: 2024-06-17

## 2024-06-27 PROBLEM — R91.8 OTHER NONSPECIFIC ABNORMAL FINDING OF LUNG FIELD: Chronic | Status: ACTIVE | Noted: 2024-06-17

## 2024-06-27 PROBLEM — E04.1 NONTOXIC SINGLE THYROID NODULE: Chronic | Status: ACTIVE | Noted: 2024-06-17

## 2024-06-27 PROBLEM — J39.8 OTHER SPECIFIED DISEASES OF UPPER RESPIRATORY TRACT: Chronic | Status: ACTIVE | Noted: 2024-06-17

## 2024-06-27 PROBLEM — D84.9 IMMUNODEFICIENCY, UNSPECIFIED: Chronic | Status: ACTIVE | Noted: 2024-06-17

## 2024-06-27 PROBLEM — J45.909 UNSPECIFIED ASTHMA, UNCOMPLICATED: Chronic | Status: ACTIVE | Noted: 2024-06-17

## 2024-08-17 ENCOUNTER — NON-APPOINTMENT (OUTPATIENT)
Age: 69
End: 2024-08-17

## 2024-08-22 ENCOUNTER — APPOINTMENT (OUTPATIENT)
Dept: PULMONOLOGY | Facility: CLINIC | Age: 69
End: 2024-08-22
Payer: MEDICARE

## 2024-08-22 VITALS
HEIGHT: 67 IN | DIASTOLIC BLOOD PRESSURE: 76 MMHG | TEMPERATURE: 97.2 F | HEART RATE: 89 BPM | OXYGEN SATURATION: 98 % | BODY MASS INDEX: 21.82 KG/M2 | WEIGHT: 139 LBS | RESPIRATION RATE: 17 BRPM | SYSTOLIC BLOOD PRESSURE: 128 MMHG

## 2024-08-22 DIAGNOSIS — R93.89 ABNORMAL FINDINGS ON DIAGNOSTIC IMAGING OF OTHER SPECIFIED BODY STRUCTURES: ICD-10-CM

## 2024-08-22 DIAGNOSIS — J45.909 UNSPECIFIED ASTHMA, UNCOMPLICATED: ICD-10-CM

## 2024-08-22 DIAGNOSIS — R06.02 SHORTNESS OF BREATH: ICD-10-CM

## 2024-08-22 DIAGNOSIS — J39.8 OTHER SPECIFIED DISEASES OF UPPER RESPIRATORY TRACT: ICD-10-CM

## 2024-08-22 DIAGNOSIS — R79.89 OTHER SPECIFIED ABNORMAL FINDINGS OF BLOOD CHEMISTRY: ICD-10-CM

## 2024-08-22 DIAGNOSIS — D84.9 IMMUNODEFICIENCY, UNSPECIFIED: ICD-10-CM

## 2024-08-22 DIAGNOSIS — K21.9 GASTRO-ESOPHAGEAL REFLUX DISEASE W/OUT ESOPHAGITIS: ICD-10-CM

## 2024-08-22 DIAGNOSIS — J30.9 ALLERGIC RHINITIS, UNSPECIFIED: ICD-10-CM

## 2024-08-22 PROCEDURE — 99214 OFFICE O/P EST MOD 30 MIN: CPT | Mod: 25

## 2024-08-22 PROCEDURE — 95012 NITRIC OXIDE EXP GAS DETER: CPT

## 2024-08-22 PROCEDURE — 94010 BREATHING CAPACITY TEST: CPT

## 2024-08-22 NOTE — ASSESSMENT
[FreeTextEntry1] : Ms. Montoya is a 67 y/o female with a history of Crohn's disease, GERD, asthma, LPR, bronchiectasis/TBM (confirmed) - chronic multifactorial cough, ABIMAEL- active asthma; #1 issue is Crohn's disease ; semi active Asthma (controlled)- 8/2024  Her chronic cough is felt to be multifactorial: -Asthma -GERD/LPR   -TBM -bronchiectasis- pending  Problem 1: TBM/bronchiectasis-confirmed -continue Neurontin 100 Q8H (observation) -continue Aerobika - mucus clearance device -s/p dynamic chest CT 8/2023 -s/p INRA visit -Tracheomalacia is usually acquired in adults and common causes include damage by tracheostomy or endotracheal intubation damaging the tracheal cartilage with increase risk with multiple intubations, prolonged intubation, and concurrent high dose steroid therapy; external chest wall trauma and surgery; chronic compression of the trachea by benign etiologies (eg, benign mediastinal goiter) or malignancy; relapsing polychondritis; or recurrent infection. Tracheomalacia can be asymptomatic, however signs or symptoms can develop as the severity of the airway narrowing progresses with major symptoms include dyspnea, cough, and sputum retention. Other symptoms include severe paroxysms of coughing, wheezing or stridor, barking cough and may be exacerbated by forced expiration, cough, and valsalva maneuver. Tracheomalacia is diagnosed by a bronchoscopic visualization of dynamic airway collapse on dynamic chest CT. Therapy is warranted in symptomatic patients with severe tracheomalacia and includes surgical repair as tracheobronchoplasty. The patient was referred to Dr. Pranav Person or Dr. Vineet Ross, at [facility] for a surgical consult. -Seen on the CT of the chest or chest x-ray signifies damaged bronchial tubes focal or diffuse which can be sites of recurrent infections. These areas can be colonized by various organisms including bacteria (hemophilous influenza/Pseudomonas species etc.) as well as acid fast bacilli (myobacterial disease- inclusive of TB/PENG etc.). Sputum either for bacteria culture/sensitivity or AFB culture and sensitivity will need to be sent if the patient has sputum- 3 specimens on consecutive days will need to be dropped at the laboratory- if the patient can produce sputum.  problem 2: asthma- (NC w/ ICS) -continue Bevespi 2 puffs BID -continue Alvesco 160 2 puffs BID or Qnasl 1 sniff BID -continue Ventolin rescue inhaler -add Xopenex 0.63 via nebulizer q6H prn  -add Pulmicort 0.5% via nebulizer BID PRN -Inhaler technique reviewed as well as oral hygiene techniques reviewed with patient. Avoidance of cold air, extremes of temperature, rescue inhaler should be used before exercise. Order of medication reviewed with patient. Recommended use of a cool mist humidifier in the bedroom. -Asthma is believed to be caused by inherited (genetic) and environmental factor, but its exact cause is unknown. Asthma may be triggered by allergens, lung infections, or irritants in the air. Asthma triggers are different for each person.  problem 3: allergies -continue Dymista 1 sniff BID -recommend Xlear saline Environmental measures for allergies were encouraged including mattress and pillow cover, air purifier, and environmental controls.  problem 4: poor breathing mechanics -Proper breathing techniques were reviewed with an emphasis of exhalation. Patient instructed to breath in for 1 second and out for four seconds. Patient was encouraged to not talk while walking.  problem 4: chest/back pain (resolved) -most likely muscular as it is reproducible -instructed to use Topricin cream on the area -stretches demonstrated for patient and instructed to perform daily  problem 5: low vitamin D -continue to take vitamin D supplements -Low vitamin D Has been associated with asthma exacerbations and increased allergic symptoms. The goal based on recent information is maintaining levels between 50-70 and low normal is 30. Recommended 50,000 units every two weeks to once a month depending on the level.  problem 6: GERD/ LPR -continue Desipramine 20mg qHS -continue Pepcid 40 mg QHS -continue Dexilant 60mg QD - recommend Reflux Gourmet (Tico Marquez) -Things to avoid including overeating, spicy foods, tight clothing, eating within three hours of bed, this list is not all inclusive. -For treatment of reflux, possible options discussed including diet control, H2 blockers, PPIs, as well as coating motility agents discussed as treatment options. Timing of meals and proximity of last meal to sleep were discussed. If symptoms persist, a formal gastrointestinal evaluation is needed. -Rule of 2's: Avoid eating too late, too fast, too much, too spicy or within two hours of bedtime  problem 7: r/o low immunity -recommended ImmunoCore supplement -Due to the fact that this pt has had more infections than would be expected and immunological blood work is indicated this would include: IgG subclasses, quantitative immunoglobulins, Strep pneumoniae titers as well as Vitamin D levels. -Based on this blood work we will be able to decide where the pt needs additional pneumococcal vaccine either Prevnar 13 or pneumovax. Immunology evaluation will also be potentially indicated.  Problem 8: ?ABIMAEL (risk factors: GERD, elevated Mallampati class) -complete home sleep study (NC) -Sleep apnea is associated with adverse clinical consequences which can affect most organ systems. Cardiovascular disease risk includes arrhythmias, atrial fibrillation, hypertension, coronary artery disease, and stroke. Metabolic disorders include diabetes type 2, non-alcoholic fatty liver disease. Mood disorder especially depression; and cognitive decline especially in the elderly. Associations with chronic reflux/Benton's esophagus some but not all inclusive. -Reasons include arousal consistent with hypopnea; respiratory events most prominent in REM sleep or supine position; therefore sleep staging and body position are important for accurate diagnosis and estimation of AHI.  problem 9: health maintenance -ENT: recommended speech therapy -recommended a yearly flu shot after October 15 2024 -recommended strep pneumonia vaccines: Prevnar-13 vaccine, followed by Pneumo vaccine 23 on year following-completed -recommended early intervention for URIs -recommended osteoporosis evaluations -recommended early dermatological evaluations -recommended after the age of 50 to the age of 70, colonoscopy every 5 years  Follow up in 3-4 months The patient was encouraged to call with any changes, concerns, or questions.

## 2024-08-22 NOTE — ADDENDUM
[FreeTextEntry1] :  Documented by Brodie Contreras acting as a scribe for Dr. Hal Gardiner on 08/22/2024 .   All medical record entries made by the Scribe were at my, Dr. Hal Gardiner's direction and personally dictated by me on 08/22/2024 . I have reviewed the chart and agree that the record accurately reflects my personal performance of the history, Physical exam, assessment, and plan. I have also personally directed, reviewed, and agree with the discharge instructions.

## 2024-08-22 NOTE — PROCEDURE
[FreeTextEntry1] : PFT reveals normal flows; FEV1 was 2.20 L which is 86 % of predicted with a normal flow volume loop. PFT's for performed to evaluate for SOB.    FENO was 15; a normal value being less than 25. Fractional exhaled nitric oxide (FENO) is regarded as a simple, noninvasive method for assessing eosinophilic airway inflammation. Produced by a variety of cells within the lung, nitric oxide (NO) concentrations are generally low in healthy individuals. However, high concentrations of NO appear to be involved in nonspecific host defense mechanisms and chronic inflammatory diseases such as asthma. The American Thoracic Society (ATS) therefore has strongly recommended using FENO to aid in the assessment, management, and long-term monitoring of eosinophilic airway inflammation and asthma, and for identifying steroid responsive individuals whose chronic respiratory symptoms may be caused by airway inflammation. In their 2011 clinical practice guideline, the ATS emphasizes the importance of using FENO.

## 2024-08-22 NOTE — HISTORY OF PRESENT ILLNESS
[FreeTextEntry1] : Ms. Montoya is a 68 year old female presenting to the office today for a follow up pulmonary evaluation for allergies, asthma, GERD, low vitamin D, and post nasal drip. Her chief complaint is   -she had a bronchoscopy  -she notes she stopped taking al her inhalers and nebulizer due to her conditions being better -she notes now being on Stelara now  -she notes she has tracheomalacia  She notes exercising via walking -she notes the Bevespi conteracts the desipramine and was told she should not take the two together  she notes Her bowels are regular...  -she notes her sense of smell and taste are normal she notes sleep is good  -she notes that her diet is good  she notes that her appetite is good..   -Patient denies any headaches, nausea, vomiting, fever, chills, sweats, chest pain, chest pressure, palpitations, coughing, wheezing, fatigue, diarrhea, constipation, dysphagia, arthralgias, myalgias, dizziness, leg swelling, leg pain, itchy eyes, itchy ears, dysphonia, heartburn, reflux or sour taste in mouth.

## 2024-09-19 ENCOUNTER — OUTPATIENT (OUTPATIENT)
Dept: OUTPATIENT SERVICES | Facility: HOSPITAL | Age: 69
LOS: 1 days | End: 2024-09-19
Payer: MEDICARE

## 2024-09-19 ENCOUNTER — APPOINTMENT (OUTPATIENT)
Dept: CT IMAGING | Facility: IMAGING CENTER | Age: 69
End: 2024-09-19
Payer: MEDICARE

## 2024-09-19 DIAGNOSIS — Z98.890 OTHER SPECIFIED POSTPROCEDURAL STATES: Chronic | ICD-10-CM

## 2024-09-19 DIAGNOSIS — Z90.49 ACQUIRED ABSENCE OF OTHER SPECIFIED PARTS OF DIGESTIVE TRACT: Chronic | ICD-10-CM

## 2024-09-19 DIAGNOSIS — E89.0 POSTPROCEDURAL HYPOTHYROIDISM: Chronic | ICD-10-CM

## 2024-09-19 DIAGNOSIS — Z00.8 ENCOUNTER FOR OTHER GENERAL EXAMINATION: ICD-10-CM

## 2024-09-19 PROCEDURE — 74176 CT ABD & PELVIS W/O CONTRAST: CPT

## 2024-09-19 PROCEDURE — 74176 CT ABD & PELVIS W/O CONTRAST: CPT | Mod: 26,MH

## 2024-09-23 ENCOUNTER — APPOINTMENT (OUTPATIENT)
Dept: SURGERY | Facility: CLINIC | Age: 69
End: 2024-09-23
Payer: MEDICARE

## 2024-09-23 VITALS
HEART RATE: 94 BPM | DIASTOLIC BLOOD PRESSURE: 81 MMHG | SYSTOLIC BLOOD PRESSURE: 136 MMHG | OXYGEN SATURATION: 98 % | TEMPERATURE: 97.1 F | RESPIRATION RATE: 17 BRPM

## 2024-09-23 DIAGNOSIS — K61.39 OTHER ISCHIORECTAL ABSCESS: ICD-10-CM

## 2024-09-23 DIAGNOSIS — K50.10 CROHN'S DISEASE OF LARGE INTESTINE W/OUT COMPLICATIONS: ICD-10-CM

## 2024-09-23 PROCEDURE — 46040 I&D ISCHIORCT&/PERIRCT ABSC: CPT

## 2024-09-23 PROCEDURE — 99213 OFFICE O/P EST LOW 20 MIN: CPT | Mod: 25

## 2024-09-23 RX ORDER — UPADACITINIB 1 MG/ML
SOLUTION ORAL
Refills: 0 | Status: DISCONTINUED | COMMUNITY
End: 2024-09-23

## 2024-09-23 RX ORDER — CIPROFLOXACIN HYDROCHLORIDE 500 MG/1
500 TABLET, FILM COATED ORAL
Qty: 14 | Refills: 2 | Status: ACTIVE | COMMUNITY
Start: 2024-09-23 | End: 1900-01-01

## 2024-09-23 RX ORDER — METRONIDAZOLE 500 MG/1
500 TABLET ORAL TWICE DAILY
Qty: 14 | Refills: 2 | Status: ACTIVE | COMMUNITY
Start: 2024-09-23 | End: 1900-01-01

## 2024-09-23 RX ORDER — USTEKINUMAB 130 MG/26ML
SOLUTION INTRAVENOUS
Refills: 0 | Status: ACTIVE | COMMUNITY

## 2024-09-25 NOTE — HISTORY OF PRESENT ILLNESS
[FreeTextEntry1] : Samantha is a 68 y/o female here for a follow up visit, possible hemorrhoid.   Currently on Stelara  Colonoscopy 07/14/23 by Dr. Win (Crohn's, Unspecified Site) - Erosions and exudate in the rectum. (Biopsy) Polyp in the ascending colon. (Polypectomy).  Last seen on 4/24/24 - Exam unchanged. Patient without significant change in symptoms. Topical diltiazem ineffective. The patient is not interested in a trial of Botox. She will continue with medical management as per GI. Follow-up with me as needed.  Today pt reports anal pain. Daily BMs, formed, no straining, Colace and Metamucil caps, no bleeding, no episodes of incontinence, and does have hard swollen tissue on left side since last week - has gotten larger. Denies fevers or chills. Used Aquaphor with no relief. Will be having colonoscopy on Friday.

## 2024-09-25 NOTE — ASSESSMENT
[FreeTextEntry1] : Initially it was unclear if the patient had an infected inclusion cyst or an abscess related to her perianal disease.  I recommended incision and drainage and discussed the risk of fistula.  The patient agreed.  1% lidocaine mixed with half percent Marcaine plus epinephrine injected.  Incision and drainage performed.  Probing of the wound revealed a tract extending through the ischial rectal space towards the posterior anal canal.  Therefore, this appears to be an ischial rectal infection likely related to the patient's perianal Crohn's disease.  Cipro and Flagyl prescribed.  I told the patient that the scheduled colonoscopy will be important to rule out luminal disease as long as she is comfortable.  She will follow-up with me in 2 weeks.

## 2024-09-25 NOTE — HISTORY OF PRESENT ILLNESS
[FreeTextEntry1] : Samantha is a 66 y/o female here for a follow up visit, possible hemorrhoid.   Currently on Stelara  Colonoscopy 07/14/23 by Dr. Win (Crohn's, Unspecified Site) - Erosions and exudate in the rectum. (Biopsy) Polyp in the ascending colon. (Polypectomy).  Last seen on 4/24/24 - Exam unchanged. Patient without significant change in symptoms. Topical diltiazem ineffective. The patient is not interested in a trial of Botox. She will continue with medical management as per GI. Follow-up with me as needed.  Today pt reports anal pain. Daily BMs, formed, no straining, Colace and Metamucil caps, no bleeding, no episodes of incontinence, and does have hard swollen tissue on left side since last week - has gotten larger. Denies fevers or chills. Used Aquaphor with no relief. Will be having colonoscopy on Friday.

## 2024-09-25 NOTE — PHYSICAL EXAM
[FreeTextEntry1] : Perianal inspection demonstrated external tags.  There was erythema induration and tenderness over the left anterior ischial rectal space.

## 2024-10-09 ENCOUNTER — APPOINTMENT (OUTPATIENT)
Dept: SURGERY | Facility: CLINIC | Age: 69
End: 2024-10-09
Payer: MEDICARE

## 2024-10-09 VITALS
OXYGEN SATURATION: 97 % | HEIGHT: 67 IN | DIASTOLIC BLOOD PRESSURE: 76 MMHG | SYSTOLIC BLOOD PRESSURE: 129 MMHG | WEIGHT: 134 LBS | BODY MASS INDEX: 21.03 KG/M2 | HEART RATE: 79 BPM | TEMPERATURE: 97.1 F | RESPIRATION RATE: 18 BRPM

## 2024-10-09 DIAGNOSIS — K61.39 OTHER ISCHIORECTAL ABSCESS: ICD-10-CM

## 2024-10-09 PROCEDURE — 99213 OFFICE O/P EST LOW 20 MIN: CPT | Mod: 24

## 2024-11-21 ENCOUNTER — APPOINTMENT (OUTPATIENT)
Dept: PULMONOLOGY | Facility: CLINIC | Age: 69
End: 2024-11-21
Payer: MEDICARE

## 2024-11-21 VITALS
SYSTOLIC BLOOD PRESSURE: 120 MMHG | BODY MASS INDEX: 21.19 KG/M2 | HEIGHT: 67 IN | TEMPERATURE: 97.9 F | HEART RATE: 81 BPM | OXYGEN SATURATION: 97 % | RESPIRATION RATE: 18 BRPM | DIASTOLIC BLOOD PRESSURE: 82 MMHG | WEIGHT: 135 LBS

## 2024-11-21 DIAGNOSIS — R93.89 ABNORMAL FINDINGS ON DIAGNOSTIC IMAGING OF OTHER SPECIFIED BODY STRUCTURES: ICD-10-CM

## 2024-11-21 DIAGNOSIS — J47.9 BRONCHIECTASIS, UNCOMPLICATED: ICD-10-CM

## 2024-11-21 DIAGNOSIS — J39.8 OTHER SPECIFIED DISEASES OF UPPER RESPIRATORY TRACT: ICD-10-CM

## 2024-11-21 DIAGNOSIS — K21.9 GASTRO-ESOPHAGEAL REFLUX DISEASE W/OUT ESOPHAGITIS: ICD-10-CM

## 2024-11-21 DIAGNOSIS — R05.9 COUGH, UNSPECIFIED: ICD-10-CM

## 2024-11-21 PROCEDURE — 99214 OFFICE O/P EST MOD 30 MIN: CPT

## 2024-11-29 RX ORDER — DOXYCYCLINE 100 MG/1
100 TABLET, FILM COATED ORAL TWICE DAILY
Qty: 20 | Refills: 0 | Status: ACTIVE | COMMUNITY
Start: 2024-11-29 | End: 1900-01-01

## 2024-12-06 ENCOUNTER — RX RENEWAL (OUTPATIENT)
Age: 69
End: 2024-12-06

## 2024-12-17 ENCOUNTER — RX RENEWAL (OUTPATIENT)
Age: 69
End: 2024-12-17

## 2025-01-15 ENCOUNTER — APPOINTMENT (OUTPATIENT)
Dept: OPHTHALMOLOGY | Facility: CLINIC | Age: 70
End: 2025-01-15
Payer: MEDICARE

## 2025-01-15 ENCOUNTER — NON-APPOINTMENT (OUTPATIENT)
Age: 70
End: 2025-01-15

## 2025-01-15 PROCEDURE — 92012 INTRM OPH EXAM EST PATIENT: CPT

## 2025-01-24 ENCOUNTER — NON-APPOINTMENT (OUTPATIENT)
Age: 70
End: 2025-01-24

## 2025-01-24 ENCOUNTER — APPOINTMENT (OUTPATIENT)
Dept: OPHTHALMOLOGY | Facility: CLINIC | Age: 70
End: 2025-01-24
Payer: MEDICARE

## 2025-01-24 PROCEDURE — 92002 INTRM OPH EXAM NEW PATIENT: CPT

## 2025-01-27 ENCOUNTER — APPOINTMENT (OUTPATIENT)
Dept: PULMONOLOGY | Facility: CLINIC | Age: 70
End: 2025-01-27
Payer: MEDICARE

## 2025-01-27 ENCOUNTER — NON-APPOINTMENT (OUTPATIENT)
Age: 70
End: 2025-01-27

## 2025-01-27 VITALS
RESPIRATION RATE: 18 BRPM | OXYGEN SATURATION: 97 % | SYSTOLIC BLOOD PRESSURE: 110 MMHG | BODY MASS INDEX: 20.96 KG/M2 | TEMPERATURE: 97.52 F | HEIGHT: 66.5 IN | WEIGHT: 132 LBS | HEART RATE: 88 BPM | DIASTOLIC BLOOD PRESSURE: 78 MMHG

## 2025-01-27 DIAGNOSIS — J39.8 OTHER SPECIFIED DISEASES OF UPPER RESPIRATORY TRACT: ICD-10-CM

## 2025-01-27 DIAGNOSIS — R93.89 ABNORMAL FINDINGS ON DIAGNOSTIC IMAGING OF OTHER SPECIFIED BODY STRUCTURES: ICD-10-CM

## 2025-01-27 DIAGNOSIS — K21.9 GASTRO-ESOPHAGEAL REFLUX DISEASE W/OUT ESOPHAGITIS: ICD-10-CM

## 2025-01-27 DIAGNOSIS — J45.909 UNSPECIFIED ASTHMA, UNCOMPLICATED: ICD-10-CM

## 2025-01-27 DIAGNOSIS — R79.89 OTHER SPECIFIED ABNORMAL FINDINGS OF BLOOD CHEMISTRY: ICD-10-CM

## 2025-01-27 DIAGNOSIS — J30.9 ALLERGIC RHINITIS, UNSPECIFIED: ICD-10-CM

## 2025-01-27 DIAGNOSIS — R06.02 SHORTNESS OF BREATH: ICD-10-CM

## 2025-01-27 PROCEDURE — 95012 NITRIC OXIDE EXP GAS DETER: CPT

## 2025-01-27 PROCEDURE — 99214 OFFICE O/P EST MOD 30 MIN: CPT | Mod: 25

## 2025-01-27 PROCEDURE — ZZZZZ: CPT

## 2025-02-13 ENCOUNTER — APPOINTMENT (OUTPATIENT)
Dept: OPHTHALMOLOGY | Facility: CLINIC | Age: 70
End: 2025-02-13

## 2025-02-13 PROCEDURE — 92012 INTRM OPH EXAM EST PATIENT: CPT

## 2025-03-06 ENCOUNTER — APPOINTMENT (OUTPATIENT)
Dept: OPHTHALMOLOGY | Facility: CLINIC | Age: 70
End: 2025-03-06

## 2025-03-13 RX ORDER — AZITHROMYCIN 500 MG/1
500 TABLET, FILM COATED ORAL DAILY
Qty: 7 | Refills: 0 | Status: ACTIVE | COMMUNITY
Start: 2025-03-13 | End: 1900-01-01

## 2025-04-10 ENCOUNTER — APPOINTMENT (OUTPATIENT)
Dept: MAMMOGRAPHY | Facility: CLINIC | Age: 70
End: 2025-04-10
Payer: MEDICARE

## 2025-04-10 ENCOUNTER — APPOINTMENT (OUTPATIENT)
Dept: ULTRASOUND IMAGING | Facility: CLINIC | Age: 70
End: 2025-04-10
Payer: MEDICARE

## 2025-04-10 PROCEDURE — 77067 SCR MAMMO BI INCL CAD: CPT | Mod: TC

## 2025-04-10 PROCEDURE — 76641 ULTRASOUND BREAST COMPLETE: CPT | Mod: 26,50,GA

## 2025-04-10 PROCEDURE — 77063 BREAST TOMOSYNTHESIS BI: CPT | Mod: 26

## 2025-06-04 ENCOUNTER — RX RENEWAL (OUTPATIENT)
Age: 70
End: 2025-06-04

## 2025-07-23 ENCOUNTER — APPOINTMENT (OUTPATIENT)
Dept: OPHTHALMOLOGY | Facility: CLINIC | Age: 70
End: 2025-07-23
Payer: MEDICARE

## 2025-07-23 ENCOUNTER — NON-APPOINTMENT (OUTPATIENT)
Age: 70
End: 2025-07-23

## 2025-07-23 PROCEDURE — 92014 COMPRE OPH EXAM EST PT 1/>: CPT

## 2025-08-26 ENCOUNTER — APPOINTMENT (OUTPATIENT)
Dept: PULMONOLOGY | Facility: CLINIC | Age: 70
End: 2025-08-26

## 2025-08-26 ENCOUNTER — NON-APPOINTMENT (OUTPATIENT)
Age: 70
End: 2025-08-26

## 2025-08-26 ENCOUNTER — APPOINTMENT (OUTPATIENT)
Dept: PULMONOLOGY | Facility: CLINIC | Age: 70
End: 2025-08-26
Payer: MEDICARE

## 2025-08-26 VITALS
SYSTOLIC BLOOD PRESSURE: 120 MMHG | WEIGHT: 132 LBS | DIASTOLIC BLOOD PRESSURE: 70 MMHG | RESPIRATION RATE: 18 BRPM | HEART RATE: 78 BPM | BODY MASS INDEX: 20.96 KG/M2 | OXYGEN SATURATION: 98 % | HEIGHT: 66.5 IN | TEMPERATURE: 96.2 F

## 2025-08-26 DIAGNOSIS — K21.9 GASTRO-ESOPHAGEAL REFLUX DISEASE W/OUT ESOPHAGITIS: ICD-10-CM

## 2025-08-26 DIAGNOSIS — J45.909 UNSPECIFIED ASTHMA, UNCOMPLICATED: ICD-10-CM

## 2025-08-26 DIAGNOSIS — R79.89 OTHER SPECIFIED ABNORMAL FINDINGS OF BLOOD CHEMISTRY: ICD-10-CM

## 2025-08-26 DIAGNOSIS — R93.89 ABNORMAL FINDINGS ON DIAGNOSTIC IMAGING OF OTHER SPECIFIED BODY STRUCTURES: ICD-10-CM

## 2025-08-26 DIAGNOSIS — R06.02 SHORTNESS OF BREATH: ICD-10-CM

## 2025-08-26 DIAGNOSIS — J39.8 OTHER SPECIFIED DISEASES OF UPPER RESPIRATORY TRACT: ICD-10-CM

## 2025-08-26 DIAGNOSIS — J30.9 ALLERGIC RHINITIS, UNSPECIFIED: ICD-10-CM

## 2025-08-26 PROCEDURE — 99214 OFFICE O/P EST MOD 30 MIN: CPT | Mod: 25

## 2025-08-26 PROCEDURE — 94729 DIFFUSING CAPACITY: CPT

## 2025-08-26 PROCEDURE — 94010 BREATHING CAPACITY TEST: CPT

## 2025-08-26 PROCEDURE — ZZZZZ: CPT

## 2025-08-26 PROCEDURE — 95012 NITRIC OXIDE EXP GAS DETER: CPT

## 2025-08-26 PROCEDURE — 94727 GAS DIL/WSHOT DETER LNG VOL: CPT

## 2025-08-26 RX ORDER — INHALER, ASSIST DEVICES
SPACER (EA) MISCELLANEOUS
Qty: 1 | Refills: 2 | Status: ACTIVE | COMMUNITY
Start: 2025-08-26 | End: 1900-01-01

## 2025-09-18 ENCOUNTER — APPOINTMENT (OUTPATIENT)
Dept: SURGERY | Facility: CLINIC | Age: 70
End: 2025-09-18
Payer: MEDICARE

## 2025-09-18 VITALS
TEMPERATURE: 96.7 F | SYSTOLIC BLOOD PRESSURE: 105 MMHG | RESPIRATION RATE: 18 BRPM | DIASTOLIC BLOOD PRESSURE: 69 MMHG | BODY MASS INDEX: 20.72 KG/M2 | HEIGHT: 67 IN | HEART RATE: 71 BPM | OXYGEN SATURATION: 99 % | WEIGHT: 132 LBS

## 2025-09-18 DIAGNOSIS — K50.10 CROHN'S DISEASE OF LARGE INTESTINE W/OUT COMPLICATIONS: ICD-10-CM

## 2025-09-18 PROCEDURE — 46600 DIAGNOSTIC ANOSCOPY SPX: CPT

## 2025-09-18 PROCEDURE — 99213 OFFICE O/P EST LOW 20 MIN: CPT | Mod: 25

## 2025-09-18 RX ORDER — DENOSUMAB 60 MG/ML
60 INJECTION SUBCUTANEOUS
Refills: 0 | Status: ACTIVE | COMMUNITY